# Patient Record
Sex: FEMALE | Race: OTHER | Employment: UNEMPLOYED | URBAN - METROPOLITAN AREA
[De-identification: names, ages, dates, MRNs, and addresses within clinical notes are randomized per-mention and may not be internally consistent; named-entity substitution may affect disease eponyms.]

---

## 2017-02-02 ENCOUNTER — OFFICE VISIT (OUTPATIENT)
Dept: INTERNAL MEDICINE CLINIC | Facility: CLINIC | Age: 52
End: 2017-02-02

## 2017-02-02 VITALS
HEART RATE: 87 BPM | SYSTOLIC BLOOD PRESSURE: 112 MMHG | BODY MASS INDEX: 42.03 KG/M2 | HEIGHT: 62 IN | TEMPERATURE: 97 F | DIASTOLIC BLOOD PRESSURE: 60 MMHG | WEIGHT: 228.38 LBS | RESPIRATION RATE: 16 BRPM | OXYGEN SATURATION: 98 %

## 2017-02-02 DIAGNOSIS — G44.229 CHRONIC TENSION-TYPE HEADACHE, NOT INTRACTABLE: ICD-10-CM

## 2017-02-02 DIAGNOSIS — M17.0 PRIMARY OSTEOARTHRITIS OF BOTH KNEES: ICD-10-CM

## 2017-02-02 DIAGNOSIS — E66.01 MORBID OBESITY WITH BMI OF 40.0-44.9, ADULT (HCC): ICD-10-CM

## 2017-02-02 DIAGNOSIS — E78.2 MIXED HYPERLIPIDEMIA: ICD-10-CM

## 2017-02-02 DIAGNOSIS — E11.9 TYPE 2 DIABETES MELLITUS WITHOUT COMPLICATION, WITHOUT LONG-TERM CURRENT USE OF INSULIN (HCC): ICD-10-CM

## 2017-02-02 DIAGNOSIS — M54.31 SCIATICA, RIGHT SIDE: Primary | ICD-10-CM

## 2017-02-02 DIAGNOSIS — M79.2 NEURITIS: ICD-10-CM

## 2017-02-02 PROCEDURE — 99214 OFFICE O/P EST MOD 30 MIN: CPT | Performed by: FAMILY MEDICINE

## 2017-02-02 PROCEDURE — 20610 DRAIN/INJ JOINT/BURSA W/O US: CPT | Performed by: FAMILY MEDICINE

## 2017-02-02 RX ORDER — ATORVASTATIN CALCIUM 40 MG/1
TABLET, FILM COATED ORAL
Refills: 0 | COMMUNITY
Start: 2017-01-28 | End: 2017-07-18

## 2017-02-02 RX ORDER — TRIAMCINOLONE ACETONIDE 40 MG/ML
40 INJECTION, SUSPENSION INTRA-ARTICULAR; INTRAMUSCULAR ONCE
Status: DISCONTINUED | OUTPATIENT
Start: 2017-02-02 | End: 2017-02-02

## 2017-02-02 RX ORDER — NAPROXEN 500 MG/1
500 TABLET ORAL 2 TIMES DAILY PRN
Qty: 60 TABLET | Refills: 1 | Status: ON HOLD | OUTPATIENT
Start: 2017-02-02 | End: 2017-07-10

## 2017-02-02 RX ORDER — GABAPENTIN 300 MG/1
300 CAPSULE ORAL 3 TIMES DAILY
Qty: 90 CAPSULE | Refills: 5 | Status: SHIPPED | OUTPATIENT
Start: 2017-02-02 | End: 2017-07-05

## 2017-02-02 RX ORDER — AMITRIPTYLINE HYDROCHLORIDE 25 MG/1
25 TABLET, FILM COATED ORAL NIGHTLY PRN
Qty: 30 TABLET | Refills: 5 | Status: SHIPPED | OUTPATIENT
Start: 2017-02-02 | End: 2017-03-09 | Stop reason: SINTOL

## 2017-02-02 RX ORDER — GLIPIZIDE 5 MG/1
TABLET ORAL
Refills: 4 | COMMUNITY
Start: 2016-12-27 | End: 2017-07-05 | Stop reason: DRUGHIGH

## 2017-02-03 NOTE — PROGRESS NOTES
CC:  Knee Pain and Leg Pain      Hx of CC:  FOLLOW UP ON CHRONIC KNEE PAINS R > L.  NOW ALSO WITH LOW BACK PAIN RADIATING TO RIGHT LEG X A COUPLE OF WEEKS. PATIENT DID NOT HAVE INSURANCE FOR A WHILE AND STOPPED ACTOS DUE TO COST.   ON METFORMIN BID AND G acetonide (KENALOG-40) 40 MG/ML injection 40 mg; Inject 1 mL (40 mg total) into the articular space once ON RIGHT SIDE WITH 1 CC LIDOCAINE (ANTEROMEDIAL APPROACH)    5.  Type 2 diabetes mellitus without complication, without long-term current use of insulin

## 2017-02-08 LAB
ALBUMIN SERPL BCP-MCNC: 4.1 G/DL (ref 3.5–4.8)
ALBUMIN/GLOB SERPL: 1.4 {RATIO} (ref 1–2)
ALP SERPL-CCNC: 70 U/L (ref 32–100)
ALT SERPL-CCNC: 18 U/L (ref 14–54)
ANION GAP SERPL CALC-SCNC: 9 MMOL/L (ref 0–18)
AST SERPL-CCNC: 19 U/L (ref 15–41)
BILIRUB SERPL-MCNC: 0.9 MG/DL (ref 0.3–1.2)
BUN SERPL-MCNC: 6 MG/DL (ref 8–20)
BUN/CREAT SERPL: 12 (ref 10–20)
CALCIUM SERPL-MCNC: 9.6 MG/DL (ref 8.5–10.5)
CHLORIDE SERPL-SCNC: 105 MMOL/L (ref 95–110)
CHOLEST SERPL-MCNC: 117 MG/DL (ref 110–200)
CO2 SERPL-SCNC: 29 MMOL/L (ref 22–32)
CREAT SERPL-MCNC: 0.5 MG/DL (ref 0.5–1.5)
GLOBULIN PLAS-MCNC: 2.9 G/DL (ref 2.5–3.7)
GLUCOSE SERPL-MCNC: 101 MG/DL (ref 70–99)
HBA1C MFR BLD: 6.5 % (ref 4–6)
HDLC SERPL-MCNC: 39 MG/DL
LDLC SERPL CALC-MCNC: 63 MG/DL (ref 0–99)
NONHDLC SERPL-MCNC: 78 MG/DL
OSMOLALITY UR CALC.SUM OF ELEC: 294 MOSM/KG (ref 275–295)
POTASSIUM SERPL-SCNC: 4.3 MMOL/L (ref 3.3–5.1)
PROT SERPL-MCNC: 7 G/DL (ref 5.9–8.4)
SODIUM SERPL-SCNC: 143 MMOL/L (ref 136–144)
TRIGL SERPL-MCNC: 75 MG/DL (ref 1–149)

## 2017-02-08 PROCEDURE — 83036 HEMOGLOBIN GLYCOSYLATED A1C: CPT | Performed by: FAMILY MEDICINE

## 2017-02-08 PROCEDURE — 80061 LIPID PANEL: CPT | Performed by: FAMILY MEDICINE

## 2017-02-08 PROCEDURE — 80053 COMPREHEN METABOLIC PANEL: CPT | Performed by: FAMILY MEDICINE

## 2017-02-17 RX ORDER — CYCLOBENZAPRINE HCL 10 MG
10 TABLET ORAL 3 TIMES DAILY PRN
Qty: 30 TABLET | Refills: 1 | Status: SHIPPED | OUTPATIENT
Start: 2017-02-17 | End: 2017-03-09

## 2017-03-04 ENCOUNTER — HOSPITAL ENCOUNTER (OUTPATIENT)
Dept: GENERAL RADIOLOGY | Age: 52
Discharge: HOME | End: 2017-03-04
Attending: FAMILY MEDICINE
Payer: MEDICAID

## 2017-03-04 DIAGNOSIS — M54.31 SCIATICA, RIGHT SIDE: ICD-10-CM

## 2017-03-04 PROCEDURE — 72120 X-RAY BEND ONLY L-S SPINE: CPT

## 2017-03-09 ENCOUNTER — OFFICE VISIT (OUTPATIENT)
Dept: INTERNAL MEDICINE CLINIC | Facility: CLINIC | Age: 52
End: 2017-03-09

## 2017-03-09 VITALS
HEIGHT: 62 IN | WEIGHT: 228 LBS | HEART RATE: 69 BPM | DIASTOLIC BLOOD PRESSURE: 72 MMHG | BODY MASS INDEX: 41.96 KG/M2 | SYSTOLIC BLOOD PRESSURE: 118 MMHG | OXYGEN SATURATION: 98 %

## 2017-03-09 DIAGNOSIS — E66.01 MORBID OBESITY WITH BMI OF 40.0-44.9, ADULT (HCC): ICD-10-CM

## 2017-03-09 DIAGNOSIS — M54.31 SCIATICA, RIGHT SIDE: Primary | ICD-10-CM

## 2017-03-09 PROCEDURE — 99213 OFFICE O/P EST LOW 20 MIN: CPT | Performed by: FAMILY MEDICINE

## 2017-03-09 RX ORDER — PREDNISONE 20 MG/1
TABLET ORAL
Qty: 9 TABLET | Refills: 0 | Status: SHIPPED | OUTPATIENT
Start: 2017-03-09 | End: 2017-03-27 | Stop reason: ALTCHOICE

## 2017-03-09 RX ORDER — HYDROCODONE BITARTRATE AND ACETAMINOPHEN 5; 325 MG/1; MG/1
1 TABLET ORAL EVERY 6 HOURS PRN
Qty: 50 TABLET | Refills: 0 | Status: SHIPPED | OUTPATIENT
Start: 2017-03-09 | End: 2017-06-19

## 2017-03-09 NOTE — PROGRESS NOTES
CC:  Pain      Hx of CC:  PERSISTENT SEVERE RIGHT LOW BACK PAIN RADIATING TO POSTERIOR RIGHT LEG.  WORSE WITH WEIGHT BEARING OR MOVEMENT. NO ACUTE INJURY.     Vitals:    03/09/17  0940   BP: 118/72   Pulse: 69   Height: 62\"   Weight: 228 lb   SpO2: 98%

## 2017-03-27 ENCOUNTER — OFFICE VISIT (OUTPATIENT)
Dept: INTERNAL MEDICINE CLINIC | Facility: CLINIC | Age: 52
End: 2017-03-27

## 2017-03-27 VITALS
HEIGHT: 62 IN | DIASTOLIC BLOOD PRESSURE: 72 MMHG | SYSTOLIC BLOOD PRESSURE: 128 MMHG | WEIGHT: 227 LBS | OXYGEN SATURATION: 98 % | HEART RATE: 83 BPM | BODY MASS INDEX: 41.77 KG/M2

## 2017-03-27 DIAGNOSIS — J01.10 ACUTE FRONTAL SINUSITIS, RECURRENCE NOT SPECIFIED: ICD-10-CM

## 2017-03-27 DIAGNOSIS — H10.13 ACUTE ATOPIC CONJUNCTIVITIS OF BOTH EYES: ICD-10-CM

## 2017-03-27 DIAGNOSIS — J30.2 SEASONAL ALLERGIC RHINITIS, UNSPECIFIED ALLERGIC RHINITIS TRIGGER: Primary | ICD-10-CM

## 2017-03-27 PROCEDURE — 99213 OFFICE O/P EST LOW 20 MIN: CPT | Performed by: FAMILY MEDICINE

## 2017-03-27 RX ORDER — KETOTIFEN FUMARATE 0.35 MG/ML
1 SOLUTION/ DROPS OPHTHALMIC 2 TIMES DAILY
Qty: 5 ML | Refills: 2 | Status: SHIPPED | OUTPATIENT
Start: 2017-03-27 | End: 2018-02-06 | Stop reason: ALTCHOICE

## 2017-03-27 RX ORDER — FLUTICASONE PROPIONATE 50 MCG
1 SPRAY, SUSPENSION (ML) NASAL 2 TIMES DAILY PRN
Qty: 1 BOTTLE | Refills: 2 | Status: SHIPPED | OUTPATIENT
Start: 2017-03-27 | End: 2018-02-06 | Stop reason: ALTCHOICE

## 2017-03-27 RX ORDER — AMOXICILLIN 875 MG/1
875 TABLET, COATED ORAL 2 TIMES DAILY
Qty: 28 TABLET | Refills: 0 | Status: SHIPPED | OUTPATIENT
Start: 2017-03-27 | End: 2017-04-10

## 2017-03-29 NOTE — PROGRESS NOTES
CC:  Sore Throat      Hx of CC:  ITCHY EYES, NOSE CONGESTION AND FRONTAL PRESSURE X 10 DAYS OR SO.    Vitals:    03/27/17  1710   BP: 128/72   Pulse: 83   Height: 62\"   Weight: 227 lb   SpO2: 98%         Body mass index is 41.51 kg/(m^2).     ROS:  General

## 2017-04-25 ENCOUNTER — OFFICE VISIT (OUTPATIENT)
Dept: FAMILY MEDICINE CLINIC | Facility: CLINIC | Age: 52
End: 2017-04-25

## 2017-04-25 VITALS
OXYGEN SATURATION: 97 % | WEIGHT: 220 LBS | RESPIRATION RATE: 14 BRPM | DIASTOLIC BLOOD PRESSURE: 70 MMHG | SYSTOLIC BLOOD PRESSURE: 104 MMHG | BODY MASS INDEX: 40 KG/M2 | TEMPERATURE: 100 F | HEART RATE: 110 BPM

## 2017-04-25 DIAGNOSIS — J02.0 STREP THROAT: Primary | ICD-10-CM

## 2017-04-25 PROCEDURE — 87880 STREP A ASSAY W/OPTIC: CPT | Performed by: NURSE PRACTITIONER

## 2017-04-25 PROCEDURE — 99213 OFFICE O/P EST LOW 20 MIN: CPT | Performed by: NURSE PRACTITIONER

## 2017-04-25 RX ORDER — CYCLOBENZAPRINE HCL 10 MG
TABLET ORAL
COMMUNITY
Start: 2017-03-27 | End: 2017-05-11

## 2017-04-25 RX ORDER — AMOXICILLIN 875 MG/1
875 TABLET, COATED ORAL 2 TIMES DAILY
Qty: 20 TABLET | Refills: 0 | Status: SHIPPED | OUTPATIENT
Start: 2017-04-25 | End: 2017-05-05

## 2017-04-25 RX ORDER — ASPIRIN 81 MG
TABLET, DELAYED RELEASE (ENTERIC COATED) ORAL
COMMUNITY
Start: 2017-03-14 | End: 2018-02-06

## 2017-04-25 RX ORDER — GABAPENTIN 600 MG/1
600 TABLET ORAL 3 TIMES DAILY PRN
COMMUNITY
Start: 2017-04-25 | End: 2017-07-18

## 2017-04-25 NOTE — PROGRESS NOTES
CHIEF COMPLAINT:   Patient presents with:  URI    Translation provided by  and partially by  Lance Shah ID# [id number]     HPI:   Rachid Baron is a 45year old female who presents with URI for 0.5 days.    Onset was quick ,   Symptoms are progre Pioglitazone HCl 45 MG Oral Tab Take 1 tablet (45 mg total) by mouth daily.  Increasing dosage from 30 --> 45 mg Disp: 30 tablet Rfl: 5   Vitamin D, Ergocalciferol, (DRISDOL) 37943 UNITS Oral Cap  Disp:  Rfl: 0   Omeprazole 20 MG Oral Tab EC  Disp:  Rfl: 3 HEAD: atraumatic, normocephalic, no tenderness on palpation of sinuses.   EYES: conjunctiva clear, EOM intact, normal pupils, PERRLA  EARS: Canals are clear with no discharge or inflammation, TM's are pearly white and intact, some fluid present behind TM's, Sig: Take 1 tablet (875 mg total) by mouth 2 (two) times daily. Risks, benefits, and side effects of medication explained and discussed.     Patient Instructions     Faringitis Por Estreptococos [Pharyngitis, Strep - Confirmed]    Ara roman anabell · Niños: Use acetaminofén (Tylenol) para [employer]KE fiebre, el nerviosismo y el malestar.  En niños mayores de seis meses puede usar ibuprofeno (emerson Motrin infantil) en vez de Tylenol.  [NOTA: Si el andree tiene jessee enfermedad hepática o renal crónica, o ha © 1277-5215 The [facility], [facility] [place]. Todos los derechos reservados. Esta información no pretende sustituir la atención médica profesional. Sólo plummer médico puede diagnosticar y tratar un problema de oliverio. Si olvida jessee dosis, tómela lo antes posible. Si es diomedes la hora de la próxima dosis, tome sólo he dosis. No tome dosis adicionales o dobles. ¿Dónde kalin guardar mi medicina? Mant[place]ala fuera del alcance de los niños.   Donnal Arrant a Fei Kwok, e La fiebre [fever] es jessee reacción natural que el cuerpo tiene ante jessee enfermedad. En la [employer], tener temperatura lulu no es algo santino en sí mismo.  En realidad, [place] a que el cuerpo pueda luchar contra las infecciones [infections].  No nece · No use aspirina [aspirin] en un andree jorge de 18 años que tenga Reginaldo, porque puede provocarle graves daños en el hígado. Trino jessee VISITA DE CONTROL a plummer médico, o según le indique nuestro personal médico, si no mejora después de 48 horas.   Terra Abed

## 2017-04-25 NOTE — PATIENT INSTRUCTIONS
Faringitis Por Estreptococos [Pharyngitis, Strep - Confirmed]    [place] prueba anabell positiva a la infección por estreptococos. Se trata de jessee enfermedad contagiosa.  La puede contagiar al toser, al besar o al tocar a otras personas después de haberse tocado l · Niños: Use acetaminofén (Tylenol) para [employer]KE fiebre, el nerviosismo y el malestar.  En niños mayores de seis meses puede usar ibuprofeno (emerson Motrin infantil) en vez de Tylenol.  [NOTA: Si el andree tiene jessee enfermedad hepática o renal crónica, o ha © 9500-2394 The [facility], [facility] [place]. Todos los derechos reservados. Esta información no pretende sustituir la atención médica profesional. Sólo plummer médico puede diagnosticar y tratar un problema de oliverio. Si olvida jessee dosis, tómela lo antes posible. Si es diomedes la hora de la próxima dosis, tome sólo he dosis. No tome dosis adicionales o dobles. ¿Dónde kalin guardar mi medicina? [place] fuera del alcance de los niños.   Cressey Heater a [facility], e La fiebre [fever] es jessee reacción natural que el cuerpo tiene ante jessee enfermedad. En la [employer], tener temperatura lulu no es algo santino en sí mismo.  En realidad, [place] a que el cuerpo pueda luchar contra las infecciones [infections].  No nece · No use aspirina [aspirin] en un andree jorge de 18 años que tenga Reginaldo, porque puede provocarle graves daños en el hígado. Trino jessee VISITA DE CONTROL a plummer médico, o según le indique nuestro personal médico, si no mejora después de 48 horas.   Griffin Bocanegra

## 2017-05-11 ENCOUNTER — OFFICE VISIT (OUTPATIENT)
Dept: INTERNAL MEDICINE CLINIC | Facility: CLINIC | Age: 52
End: 2017-05-11

## 2017-05-11 VITALS
HEART RATE: 77 BPM | SYSTOLIC BLOOD PRESSURE: 128 MMHG | BODY MASS INDEX: 41.96 KG/M2 | WEIGHT: 228 LBS | DIASTOLIC BLOOD PRESSURE: 70 MMHG | HEIGHT: 62 IN | OXYGEN SATURATION: 98 %

## 2017-05-11 DIAGNOSIS — Z87.81 HISTORY OF FRACTURE OF RIGHT ANKLE: ICD-10-CM

## 2017-05-11 DIAGNOSIS — Z98.890 STATUS POST ORIF OF FRACTURE OF ANKLE: ICD-10-CM

## 2017-05-11 DIAGNOSIS — Z87.81 STATUS POST ORIF OF FRACTURE OF ANKLE: ICD-10-CM

## 2017-05-11 DIAGNOSIS — M54.31 SCIATICA, RIGHT SIDE: Primary | ICD-10-CM

## 2017-05-11 DIAGNOSIS — B35.6 TINEA CRURIS: ICD-10-CM

## 2017-05-11 DIAGNOSIS — M62.838 MUSCLE SPASM OF RIGHT LOWER EXTREMITY: ICD-10-CM

## 2017-05-11 DIAGNOSIS — Z12.39 BREAST CANCER SCREENING: ICD-10-CM

## 2017-05-11 PROCEDURE — 99214 OFFICE O/P EST MOD 30 MIN: CPT | Performed by: FAMILY MEDICINE

## 2017-05-11 RX ORDER — KETOCONAZOLE 20 MG/G
1 CREAM TOPICAL DAILY
Qty: 60 G | Refills: 1 | Status: SHIPPED | OUTPATIENT
Start: 2017-05-11 | End: 2017-07-05

## 2017-05-11 RX ORDER — CYCLOBENZAPRINE HCL 10 MG
10 TABLET ORAL 3 TIMES DAILY PRN
Qty: 90 TABLET | Refills: 0 | Status: SHIPPED | OUTPATIENT
Start: 2017-05-11 | End: 2017-07-18

## 2017-05-12 NOTE — PROGRESS NOTES
CC:  UTI; Referral; and Orders Call      Hx of CC:  VULVAR RASH WITH BURNS A LITTLE ON URINATING. NO URINE PAIN PER SE.  PATIENT S/P RIGHT ANKLE ORIF 10 YEARS AGO, C/O RESIDUAL LATERAL ANKLE PAIN AT SITE OF PLATE-->WISHING TO HAVE METAL HARDWARE REMOVED.

## 2017-05-17 DIAGNOSIS — J20.9 BRONCHITIS, ACUTE, WITH BRONCHOSPASM: Primary | ICD-10-CM

## 2017-05-18 RX ORDER — ALBUTEROL SULFATE 90 UG/1
2 AEROSOL, METERED RESPIRATORY (INHALATION) EVERY 4 HOURS PRN
Qty: 1 INHALER | Refills: 0 | Status: SHIPPED | OUTPATIENT
Start: 2017-05-18 | End: 2017-09-02

## 2017-05-19 ENCOUNTER — OFFICE VISIT (OUTPATIENT)
Dept: FAMILY MEDICINE CLINIC | Facility: CLINIC | Age: 52
End: 2017-05-19

## 2017-05-19 VITALS
DIASTOLIC BLOOD PRESSURE: 64 MMHG | SYSTOLIC BLOOD PRESSURE: 116 MMHG | TEMPERATURE: 98 F | BODY MASS INDEX: 39.87 KG/M2 | HEART RATE: 70 BPM | WEIGHT: 225 LBS | HEIGHT: 63 IN | RESPIRATION RATE: 16 BRPM | OXYGEN SATURATION: 97 %

## 2017-05-19 DIAGNOSIS — J01.10 ACUTE FRONTAL SINUSITIS, RECURRENCE NOT SPECIFIED: Primary | ICD-10-CM

## 2017-05-19 PROCEDURE — 99213 OFFICE O/P EST LOW 20 MIN: CPT | Performed by: NURSE PRACTITIONER

## 2017-05-19 RX ORDER — AMOXICILLIN AND CLAVULANATE POTASSIUM 875; 125 MG/1; MG/1
1 TABLET, FILM COATED ORAL 2 TIMES DAILY
Qty: 14 TABLET | Refills: 0 | Status: SHIPPED | OUTPATIENT
Start: 2017-05-19 | End: 2017-05-26

## 2017-05-19 NOTE — PROGRESS NOTES
Enmanuel Galdamez CHIEF COMPLAINT:   Patient presents with:  Cough/URI: no fever, X 2 weeks,       HPI:   Anne Marie Mason is a 45year old female who presents for cold symptoms for  2  weeks.  Symptoms have progressed into sinus congestion and been worsening since onset gabapentin 300 MG Oral Cap Take 1 capsule (300 mg total) by mouth 3 (three) times daily. Disp: 90 capsule Rfl: 5   aspirin 81 MG Oral Tab Take 1 tablet (81 mg total) by mouth daily.  Disp: 30 tablet Rfl: 11   lisinopril 2.5 MG Oral Tab Take 1 tablet (2.5 mg HEAD: atraumatic, normocephalic, +  tenderness on palpation of frontal sinuses  EYES: conjunctiva clear, EOM intact  EARS: TM's gray, non bulging, no retraction, serous fluid, bony landmarks visible  NOSE: nostrils patent, clear nasal mucous, nasal mucosa The sinuses are air-filled spaces within the bones of the face. They connect to the inside of the nose. Sinusitis is an inflammation of the tissue lining the sinus cavity. Sinus inflammation can occur during a cold.  It can also be due to allergies to polle · Do not use nasal rinses or irrigation during an acute sinus infection, unless told to by your health care provider. Rinsing may spread the infection to other sinuses.   · Use acetaminophen or ibuprofen to control pain, unless another pain medicine was pre Los senos paranasales son cavidades llenas de aire dentro de los huesos de la saranya. Se conectan con la parte interna de la Carter.  La sinusitis es jessee inflamación del tejido que recubre la cavidad del seno paranasal. Rey inflamación puede presentarse belem · Puede jason algún descongestionante sin receta a menos que le hayan recetado algún medicamento similar. Los sprays nasales son los que tienen [address] rápido.  Use alguno que contenga fenilefrina (Chapito-Synephrine, Sinex, entre otros) o oximetazolina (Afr · Termine de jason todos los medicamentos que le hayan recetado aunque ya se sienta mejor a los pocos días. Programe jessee VISITA DE CONTROL con plummer médico o a milagros centro, o según le indique nuestro personal médico, si no se siente mejor.   Busque Prontament Hable con plummer pediatra para informarse acerca del uso de milagros medicamento en niños. Puede requerir atención especial.  ¿Qué efectos secundarios puedo tener al [employer] milagros medicamento?   Efectos secundarios que debe informar a plummer médico o a plummer profesional d · mononucleosis  · jessee reacción alérgica o inusual a la amoxicilina, penicilinas, cefalosporínicos, a otros antibióticos, al ácido clavulánico, a otros medicamentos, alimentos, colorantes o conservantes  · si está embarazada o buscando quedar embarazada  · Take this medicine by mouth with a full glass of water. Follow the directions on the prescription label. Take at the start of a meal. Do not crush or chew. If the tablet has a score line, you may cut it in half at the score line for easier swallowing.  Take What should I tell my health care provider before I take this medicine?   They need to know if you have any of these conditions:  · bowel disease, like colitis  · kidney disease  · liver disease  · mononucleosis  · an unusual or allergic reaction to amoxici What side effects may I notice from receiving this medicine?   Side effects that you should report to your doctor or health care professional as soon as possible:  · allergic reactions like skin rash, itching or hives, swelling of the face, lips, or tongue Do not treat yourself for a cough for more than 1 week without consulting your doctor or health care professional. If you have a high fever, skin rash, lasting headache, or sore throat, see your doctor.   Drink 6 to 8 glasses of water daily while you are ta · problemas respiratorios  · confusión  · agitación, nerviosismo, inquietud o irritabilidad  Efectos secundarios que, por lo general, no requieren [employer] (debe informarlos a plummer médico o a plummer profesional de la oliverio si persisten o si son molestos): En denise de tos, no se trate usted mismo rajinder más de 1 semana sin consultar a plummer médico o a plummer profesional de [employer].  Si tiene fiebre lulu, erupción cutánea, dolor de hazel persistente o dolor de garganta, consulte a plummer médico.  Janell 6 a 8 vasos de ag

## 2017-05-19 NOTE — PATIENT INSTRUCTIONS
Sinusitis (Antibiotic Treatment)    The sinuses are air-filled spaces within the bones of the face. They connect to the inside of the nose. Sinusitis is an inflammation of the tissue lining the sinus cavity. Sinus inflammation can occur during a cold.  It · Do not use nasal rinses or irrigation during an acute sinus infection, unless told to by your health care provider. Rinsing may spread the infection to other sinuses.   · Use acetaminophen or ibuprofen to control pain, unless another pain medicine was pre The sinuses are air-filled spaces within the bones of the face. They connect to the inside of the nose. Sinusitis is an inflammation of the tissue lining the sinus cavity. Sinus inflammation can occur during a cold.  It can also be due to allergies to polle Los senos paranasales son cavidades llenas de aire dentro de los huesos de la saranya. Se conectan con la parte interna de la Carter.  La sinusitis es jessee inflamación del tejido que recubre la cavidad del seno paranasal. Rey inflamación puede presentarse belem · Puede jason algún descongestionante sin receta a menos que le hayan recetado algún medicamento similar. Los sprays nasales son los que tienen [address] rápido.  Use alguno que contenga fenilefrina (Chapito-Synephrine, Sinex, entre otros) o oximetazolina (Afr · Termine de jason todos los medicamentos que le hayan recetado aunque ya se sienta mejor a los pocos días. Programe jessee VISITA DE CONTROL con plummer médico o a milagros centro, o según le indique nuestro personal médico, si no se siente mejor.   Busque Prontament Hable con plummer pediatra para informarse acerca del uso de milagros medicamento en niños. Puede requerir atención especial.  ¿Qué efectos secundarios puedo tener al [employer] milagros medicamento?   Efectos secundarios que debe informar a plummer médico o a plummer profesional d · mononucleosis  · jessee reacción alérgica o inusual a la amoxicilina, penicilinas, cefalosporínicos, a otros antibióticos, al ácido clavulánico, a otros medicamentos, alimentos, colorantes o conservantes  · si está embarazada o buscando quedar embarazada  · Take this medicine by mouth with a full glass of water. Follow the directions on the prescription label. Take at the start of a meal. Do not crush or chew. If the tablet has a score line, you may cut it in half at the score line for easier swallowing.  Take What should I tell my health care provider before I take this medicine?   They need to know if you have any of these conditions:  · bowel disease, like colitis  · kidney disease  · liver disease  · mononucleosis  · an unusual or allergic reaction to amoxici What side effects may I notice from receiving this medicine?   Side effects that you should report to your doctor or health care professional as soon as possible:  · allergic reactions like skin rash, itching or hives, swelling of the face, lips, or tongue Do not treat yourself for a cough for more than 1 week without consulting your doctor or health care professional. If you have a high fever, skin rash, lasting headache, or sore throat, see your doctor.   Drink 6 to 8 glasses of water daily while you are ta · problemas respiratorios  · confusión  · agitación, nerviosismo, inquietud o irritabilidad  Efectos secundarios que, por lo general, no requieren [employer] (debe informarlos a plummer médico o a plummer profesional de la oliverio si persisten o si son molestos): En denise de tos, no se trate usted mismo rajinder más de 1 semana sin consultar a plummer médico o a plummer profesional de [employer].  Si tiene fiebre lulu, erupción cutánea, dolor de hazel persistente o dolor de garganta, consulte a plummer médico.  Janell 6 a 8 vasos de ag

## 2017-05-23 ENCOUNTER — TELEPHONE (OUTPATIENT)
Dept: INTERNAL MEDICINE CLINIC | Facility: CLINIC | Age: 52
End: 2017-05-23

## 2017-06-19 DIAGNOSIS — M54.31 SCIATICA, RIGHT SIDE: Primary | ICD-10-CM

## 2017-06-20 ENCOUNTER — OFFICE VISIT (OUTPATIENT)
Dept: ORTHOPEDICS CLINIC | Facility: CLINIC | Age: 52
End: 2017-06-20

## 2017-06-20 ENCOUNTER — HOSPITAL ENCOUNTER (OUTPATIENT)
Dept: GENERAL RADIOLOGY | Facility: HOSPITAL | Age: 52
Discharge: HOME | End: 2017-06-20
Attending: ORTHOPAEDIC SURGERY
Payer: MEDICAID

## 2017-06-20 DIAGNOSIS — M25.571 RIGHT ANKLE PAIN, UNSPECIFIED CHRONICITY: ICD-10-CM

## 2017-06-20 DIAGNOSIS — T85.848A PAIN FROM IMPLANTED HARDWARE, INITIAL ENCOUNTER: ICD-10-CM

## 2017-06-20 DIAGNOSIS — M25.571 ACUTE RIGHT ANKLE PAIN: Primary | ICD-10-CM

## 2017-06-20 DIAGNOSIS — M51.36 DDD (DEGENERATIVE DISC DISEASE), LUMBAR: ICD-10-CM

## 2017-06-20 PROCEDURE — 73610 X-RAY EXAM OF ANKLE: CPT | Performed by: ORTHOPAEDIC SURGERY

## 2017-06-20 PROCEDURE — 99212 OFFICE O/P EST SF 10 MIN: CPT | Performed by: ORTHOPAEDIC SURGERY

## 2017-06-20 PROCEDURE — 99243 OFF/OP CNSLTJ NEW/EST LOW 30: CPT | Performed by: ORTHOPAEDIC SURGERY

## 2017-06-20 RX ORDER — HYDROCODONE BITARTRATE AND ACETAMINOPHEN 5; 325 MG/1; MG/1
1 TABLET ORAL EVERY 6 HOURS PRN
Qty: 50 TABLET | Refills: 0 | Status: ON HOLD | OUTPATIENT
Start: 2017-06-20 | End: 2017-07-10

## 2017-06-21 NOTE — H&P
6/20/2017  Hong Jimenez  4/18/1965  45year old   female  Niki Haskins MD    HPI:   Patient presents with: Ankle Pain: Right- pt here w/ daughter, Nora Sanchez, for Swedish translation.  She states pt had ORIF 10 yrs ago at [facility] by Dr. Kamla Yeung. She start Ketotifen Fumarate 0.025 % Ophthalmic Solution Place 1 drop into both eyes 2 (two) times daily.  Disp: 5 mL Rfl: 2   Atorvastatin Calcium 40 MG Oral Tab TK 1 T PO D Disp:  Rfl: 0   glipiZIDE 5 MG Oral Tab TK 1 T PO QAM BEFORE BREAKFAST Disp:  Rfl: 4   nap non-tender and atraumatic with the exception of their lumbar spine and right ankle. The patient's skin is intact and compartments are soft. The patient's lower extremities are warm and pink with brisk capillary refill and 2+ distal pulses.   Sensation i weightbearing as tolerated on the right lower extremity after surgery. The patient will benefit from being evaluated by Dr. Akil Reddy for degenerative disc disease of the lumbar spine at this time.     The risks, benefits, and alternatives of surgical dominic

## 2017-06-29 ENCOUNTER — TELEPHONE (OUTPATIENT)
Dept: ORTHOPEDICS CLINIC | Facility: CLINIC | Age: 52
End: 2017-06-29

## 2017-07-05 RX ORDER — GLIPIZIDE 5 MG/1
5 TABLET ORAL
COMMUNITY
End: 2017-07-18

## 2017-07-06 ENCOUNTER — HOSPITAL ENCOUNTER (OUTPATIENT)
Dept: MAMMOGRAPHY | Age: 52
Discharge: HOME | End: 2017-07-06
Attending: FAMILY MEDICINE
Payer: COMMERCIAL

## 2017-07-06 DIAGNOSIS — Z12.39 BREAST CANCER SCREENING: ICD-10-CM

## 2017-07-06 PROCEDURE — 77067 SCR MAMMO BI INCL CAD: CPT | Performed by: FAMILY MEDICINE

## 2017-07-10 ENCOUNTER — SURGERY (OUTPATIENT)
Age: 52
End: 2017-07-10

## 2017-07-10 ENCOUNTER — ANESTHESIA (OUTPATIENT)
Dept: SURGERY | Facility: HOSPITAL | Age: 52
End: 2017-07-10
Payer: COMMERCIAL

## 2017-07-10 ENCOUNTER — APPOINTMENT (OUTPATIENT)
Dept: GENERAL RADIOLOGY | Facility: HOSPITAL | Age: 52
End: 2017-07-10
Attending: ORTHOPAEDIC SURGERY
Payer: COMMERCIAL

## 2017-07-10 ENCOUNTER — ANESTHESIA EVENT (OUTPATIENT)
Dept: SURGERY | Facility: HOSPITAL | Age: 52
End: 2017-07-10
Payer: COMMERCIAL

## 2017-07-10 ENCOUNTER — HOSPITAL ENCOUNTER (OUTPATIENT)
Facility: HOSPITAL | Age: 52
Setting detail: HOSPITAL OUTPATIENT SURGERY
Discharge: HOME | End: 2017-07-10
Attending: ORTHOPAEDIC SURGERY | Admitting: ORTHOPAEDIC SURGERY
Payer: COMMERCIAL

## 2017-07-10 VITALS
TEMPERATURE: 98 F | BODY MASS INDEX: 40.01 KG/M2 | SYSTOLIC BLOOD PRESSURE: 125 MMHG | WEIGHT: 225.81 LBS | DIASTOLIC BLOOD PRESSURE: 77 MMHG | HEIGHT: 63 IN | HEART RATE: 66 BPM | RESPIRATION RATE: 18 BRPM | OXYGEN SATURATION: 100 %

## 2017-07-10 DIAGNOSIS — Z96.9 RETAINED ORTHOPEDIC HARDWARE: Primary | ICD-10-CM

## 2017-07-10 LAB — GLUCOSE BLDC GLUCOMTR-MCNC: 127 MG/DL (ref 70–99)

## 2017-07-10 PROCEDURE — 82962 GLUCOSE BLOOD TEST: CPT

## 2017-07-10 PROCEDURE — 73600 X-RAY EXAM OF ANKLE: CPT | Performed by: ORTHOPAEDIC SURGERY

## 2017-07-10 PROCEDURE — 0SPF04Z REMOVAL OF INTERNAL FIXATION DEVICE FROM RIGHT ANKLE JOINT, OPEN APPROACH: ICD-10-PCS | Performed by: ORTHOPAEDIC SURGERY

## 2017-07-10 PROCEDURE — 88300 SURGICAL PATH GROSS: CPT | Performed by: ORTHOPAEDIC SURGERY

## 2017-07-10 PROCEDURE — 76001 XR C-ARM FLUORO >1 HOUR  (CPT=76001): CPT | Performed by: ORTHOPAEDIC SURGERY

## 2017-07-10 RX ORDER — SODIUM CHLORIDE, SODIUM LACTATE, POTASSIUM CHLORIDE, CALCIUM CHLORIDE 600; 310; 30; 20 MG/100ML; MG/100ML; MG/100ML; MG/100ML
INJECTION, SOLUTION INTRAVENOUS CONTINUOUS
Status: DISCONTINUED | OUTPATIENT
Start: 2017-07-10 | End: 2017-07-10

## 2017-07-10 RX ORDER — MORPHINE SULFATE 4 MG/ML
6 INJECTION, SOLUTION INTRAMUSCULAR; INTRAVENOUS EVERY 10 MIN PRN
Status: DISCONTINUED | OUTPATIENT
Start: 2017-07-10 | End: 2017-07-10

## 2017-07-10 RX ORDER — METOCLOPRAMIDE 10 MG/1
10 TABLET ORAL ONCE
Status: COMPLETED | OUTPATIENT
Start: 2017-07-10 | End: 2017-07-10

## 2017-07-10 RX ORDER — ONDANSETRON 2 MG/ML
4 INJECTION INTRAMUSCULAR; INTRAVENOUS ONCE AS NEEDED
Status: DISCONTINUED | OUTPATIENT
Start: 2017-07-10 | End: 2017-07-10

## 2017-07-10 RX ORDER — HYDROCODONE BITARTRATE AND ACETAMINOPHEN 5; 325 MG/1; MG/1
2 TABLET ORAL AS NEEDED
Status: COMPLETED | OUTPATIENT
Start: 2017-07-10 | End: 2017-07-10

## 2017-07-10 RX ORDER — DEXAMETHASONE SODIUM PHOSPHATE 4 MG/ML
VIAL (ML) INJECTION AS NEEDED
Status: DISCONTINUED | OUTPATIENT
Start: 2017-07-10 | End: 2017-07-10 | Stop reason: SURG

## 2017-07-10 RX ORDER — SODIUM CHLORIDE, SODIUM LACTATE, POTASSIUM CHLORIDE, CALCIUM CHLORIDE 600; 310; 30; 20 MG/100ML; MG/100ML; MG/100ML; MG/100ML
INJECTION, SOLUTION INTRAVENOUS CONTINUOUS PRN
Status: DISCONTINUED | OUTPATIENT
Start: 2017-07-10 | End: 2017-07-10 | Stop reason: SURG

## 2017-07-10 RX ORDER — HYDROMORPHONE HYDROCHLORIDE 1 MG/ML
0.2 INJECTION, SOLUTION INTRAMUSCULAR; INTRAVENOUS; SUBCUTANEOUS EVERY 5 MIN PRN
Status: DISCONTINUED | OUTPATIENT
Start: 2017-07-10 | End: 2017-07-10

## 2017-07-10 RX ORDER — HYDROCODONE BITARTRATE AND ACETAMINOPHEN 5; 325 MG/1; MG/1
1 TABLET ORAL AS NEEDED
Status: COMPLETED | OUTPATIENT
Start: 2017-07-10 | End: 2017-07-10

## 2017-07-10 RX ORDER — NALOXONE HYDROCHLORIDE 0.4 MG/ML
80 INJECTION, SOLUTION INTRAMUSCULAR; INTRAVENOUS; SUBCUTANEOUS AS NEEDED
Status: DISCONTINUED | OUTPATIENT
Start: 2017-07-10 | End: 2017-07-10

## 2017-07-10 RX ORDER — ACETAMINOPHEN 325 MG/1
650 TABLET ORAL ONCE
Status: COMPLETED | OUTPATIENT
Start: 2017-07-10 | End: 2017-07-10

## 2017-07-10 RX ORDER — HALOPERIDOL 5 MG/ML
0.25 INJECTION INTRAMUSCULAR ONCE AS NEEDED
Status: DISCONTINUED | OUTPATIENT
Start: 2017-07-10 | End: 2017-07-10

## 2017-07-10 RX ORDER — ONDANSETRON 2 MG/ML
INJECTION INTRAMUSCULAR; INTRAVENOUS AS NEEDED
Status: DISCONTINUED | OUTPATIENT
Start: 2017-07-10 | End: 2017-07-10 | Stop reason: SURG

## 2017-07-10 RX ORDER — MORPHINE SULFATE 2 MG/ML
2 INJECTION, SOLUTION INTRAMUSCULAR; INTRAVENOUS EVERY 10 MIN PRN
Status: DISCONTINUED | OUTPATIENT
Start: 2017-07-10 | End: 2017-07-10

## 2017-07-10 RX ORDER — MORPHINE SULFATE 4 MG/ML
4 INJECTION, SOLUTION INTRAMUSCULAR; INTRAVENOUS EVERY 10 MIN PRN
Status: DISCONTINUED | OUTPATIENT
Start: 2017-07-10 | End: 2017-07-10

## 2017-07-10 RX ORDER — LIDOCAINE HYDROCHLORIDE 10 MG/ML
INJECTION, SOLUTION EPIDURAL; INFILTRATION; INTRACAUDAL; PERINEURAL AS NEEDED
Status: DISCONTINUED | OUTPATIENT
Start: 2017-07-10 | End: 2017-07-10 | Stop reason: SURG

## 2017-07-10 RX ORDER — BUPIVACAINE HYDROCHLORIDE 5 MG/ML
INJECTION, SOLUTION EPIDURAL; INTRACAUDAL AS NEEDED
Status: DISCONTINUED | OUTPATIENT
Start: 2017-07-10 | End: 2017-07-10 | Stop reason: HOSPADM

## 2017-07-10 RX ORDER — HYDROCODONE BITARTRATE AND ACETAMINOPHEN 5; 325 MG/1; MG/1
1-2 TABLET ORAL EVERY 6 HOURS PRN
Qty: 40 TABLET | Refills: 0 | Status: SHIPPED | OUTPATIENT
Start: 2017-07-10 | End: 2017-07-18

## 2017-07-10 RX ORDER — FAMOTIDINE 20 MG/1
20 TABLET ORAL ONCE
Status: COMPLETED | OUTPATIENT
Start: 2017-07-10 | End: 2017-07-10

## 2017-07-10 RX ORDER — HYDROMORPHONE HYDROCHLORIDE 1 MG/ML
0.4 INJECTION, SOLUTION INTRAMUSCULAR; INTRAVENOUS; SUBCUTANEOUS EVERY 5 MIN PRN
Status: DISCONTINUED | OUTPATIENT
Start: 2017-07-10 | End: 2017-07-10

## 2017-07-10 RX ORDER — MIDAZOLAM HYDROCHLORIDE 1 MG/ML
INJECTION INTRAMUSCULAR; INTRAVENOUS AS NEEDED
Status: DISCONTINUED | OUTPATIENT
Start: 2017-07-10 | End: 2017-07-10 | Stop reason: SURG

## 2017-07-10 RX ORDER — HYDROMORPHONE HYDROCHLORIDE 1 MG/ML
0.6 INJECTION, SOLUTION INTRAMUSCULAR; INTRAVENOUS; SUBCUTANEOUS EVERY 5 MIN PRN
Status: DISCONTINUED | OUTPATIENT
Start: 2017-07-10 | End: 2017-07-10

## 2017-07-10 RX ADMIN — LIDOCAINE HYDROCHLORIDE 50 MG: 10 INJECTION, SOLUTION EPIDURAL; INFILTRATION; INTRACAUDAL; PERINEURAL at 15:23:00

## 2017-07-10 RX ADMIN — DEXAMETHASONE SODIUM PHOSPHATE 4 MG: 4 MG/ML VIAL (ML) INJECTION at 15:26:00

## 2017-07-10 RX ADMIN — SODIUM CHLORIDE, SODIUM LACTATE, POTASSIUM CHLORIDE, CALCIUM CHLORIDE: 600; 310; 30; 20 INJECTION, SOLUTION INTRAVENOUS at 16:19:00

## 2017-07-10 RX ADMIN — ONDANSETRON 4 MG: 2 INJECTION INTRAMUSCULAR; INTRAVENOUS at 15:26:00

## 2017-07-10 RX ADMIN — MIDAZOLAM HYDROCHLORIDE 2 MG: 1 INJECTION INTRAMUSCULAR; INTRAVENOUS at 15:23:00

## 2017-07-10 RX ADMIN — SODIUM CHLORIDE, SODIUM LACTATE, POTASSIUM CHLORIDE, CALCIUM CHLORIDE: 600; 310; 30; 20 INJECTION, SOLUTION INTRAVENOUS at 15:20:00

## 2017-07-10 NOTE — H&P (VIEW-ONLY)
6/20/2017  Gina Hassan  4/18/1965  45year old   female  Lita Aguilar MD    HPI:   Patient presents with: Ankle Pain: Right- pt here w/ daughter, Fareed Gruber, for Taiwanese translation.  She states pt had ORIF 10 yrs ago at [facility] by Dr. Lisa Ayala. She start Ketotifen Fumarate 0.025 % Ophthalmic Solution Place 1 drop into both eyes 2 (two) times daily.  Disp: 5 mL Rfl: 2   Atorvastatin Calcium 40 MG Oral Tab TK 1 T PO D Disp:  Rfl: 0   glipiZIDE 5 MG Oral Tab TK 1 T PO QAM BEFORE BREAKFAST Disp:  Rfl: 4   nap non-tender and atraumatic with the exception of their lumbar spine and right ankle. The patient's skin is intact and compartments are soft. The patient's lower extremities are warm and pink with brisk capillary refill and 2+ distal pulses.   Sensation i weightbearing as tolerated on the right lower extremity after surgery. The patient will benefit from being evaluated by Dr. Michell Stringer for degenerative disc disease of the lumbar spine at this time.     The risks, benefits, and alternatives of surgical dominic

## 2017-07-10 NOTE — BRIEF OP NOTE
Pre-Operative Diagnosis: right ankle painful hardware     Post-Operative Diagnosis: right ankle painful hardware     Procedure Performed:   Procedure(s):  removal of hardware right ankle     Surgeon(s) and Role:     Anthony Caceres MD - Primary

## 2017-07-10 NOTE — INTERVAL H&P NOTE
Pre-op Diagnosis: right ankle painful hardware    The above referenced H&P was reviewed by Júnior Lee MD on 7/10/2017, the patient was examined and no significant changes have occurred in the patient's condition since the H&P was performed.   I dis

## 2017-07-10 NOTE — ANESTHESIA POSTPROCEDURE EVALUATION
Patient: Jose Rico    Procedure Summary     Date:  07/10/17 Room / Location:  [facility] MAIN OR 02 / [facility] MAIN OR    Anesthesia Start:  0886 Anesthesia Stop:  0132    Procedure:  EXTREMITY LOWER HARDWARE REMOVAL (Right ) Diagnosis:  (right ankle painful h

## 2017-07-10 NOTE — OPERATIVE REPORT
[facility]    PATIENT'S NAME: Ermias Shirley   ATTENDING PHYSICIAN: Merary Ireland MD   OPERATING PHYSICIAN: Merary Ireland MD   PATIENT ACCOUNT#:   [id number]    LOCATION:  [place] [address]  MEDICAL RECORD #:   [id number]       DATE fashion. Perioperative antibiotics were infused. Confirmation of identity and laterality took place. Time-out was performed. The tourniquet was insufflated to 300 mmHg. An incision was made over the old lateral incision over the distal fibula.   Inci questions or concerns. She will do ankle pumps in order to decrease the risk of obtaining a DVT.     Dictated By Jarad Álvarez MD  d: 07/10/2017 16:14:10  t: 07/10/2017 18:03:31  [facility] [id number]  GD/

## 2017-07-13 ENCOUNTER — TELEPHONE (OUTPATIENT)
Dept: ORTHOPEDICS CLINIC | Facility: CLINIC | Age: 52
End: 2017-07-13

## 2017-07-13 NOTE — TELEPHONE ENCOUNTER
Took a shower and got her dressing  Wet. Wants to know if they can change the dressing. 1st post op appointment on July 25th.    Please advise

## 2017-07-18 ENCOUNTER — OFFICE VISIT (OUTPATIENT)
Dept: INTERNAL MEDICINE CLINIC | Facility: CLINIC | Age: 52
End: 2017-07-18

## 2017-07-18 VITALS
OXYGEN SATURATION: 98 % | DIASTOLIC BLOOD PRESSURE: 62 MMHG | BODY MASS INDEX: 40.4 KG/M2 | SYSTOLIC BLOOD PRESSURE: 120 MMHG | HEART RATE: 70 BPM | WEIGHT: 228 LBS | HEIGHT: 63 IN

## 2017-07-18 DIAGNOSIS — E11.42 CONTROLLED TYPE 2 DIABETES MELLITUS WITH DIABETIC POLYNEUROPATHY, WITHOUT LONG-TERM CURRENT USE OF INSULIN (HCC): ICD-10-CM

## 2017-07-18 DIAGNOSIS — E78.2 MIXED HYPERLIPIDEMIA: ICD-10-CM

## 2017-07-18 DIAGNOSIS — M62.838 MUSCLE SPASM OF RIGHT LOWER EXTREMITY: ICD-10-CM

## 2017-07-18 DIAGNOSIS — Z98.890 STATUS POST SURGERY: Primary | ICD-10-CM

## 2017-07-18 DIAGNOSIS — M54.31 SCIATICA, RIGHT SIDE: ICD-10-CM

## 2017-07-18 DIAGNOSIS — M50.90 CERVICAL BACK PAIN WITH EVIDENCE OF DISC DISEASE: ICD-10-CM

## 2017-07-18 PROCEDURE — 99214 OFFICE O/P EST MOD 30 MIN: CPT | Performed by: FAMILY MEDICINE

## 2017-07-18 RX ORDER — ATORVASTATIN CALCIUM 40 MG/1
TABLET, FILM COATED ORAL
COMMUNITY
Start: 2017-03-15 | End: 2018-02-06

## 2017-07-18 RX ORDER — CYCLOBENZAPRINE HCL 10 MG
10 TABLET ORAL 3 TIMES DAILY PRN
Qty: 90 TABLET | Refills: 1 | Status: SHIPPED | OUTPATIENT
Start: 2017-07-18 | End: 2018-02-06 | Stop reason: ALTCHOICE

## 2017-07-18 RX ORDER — NICOTINE POLACRILEX 4 MG/1
20 GUM, CHEWING ORAL
COMMUNITY
Start: 2015-03-31 | End: 2018-06-01

## 2017-07-18 RX ORDER — IBUPROFEN 600 MG/1
600 TABLET ORAL EVERY 8 HOURS PRN
Qty: 90 TABLET | Refills: 1 | Status: SHIPPED | OUTPATIENT
Start: 2017-07-18 | End: 2018-08-20

## 2017-07-18 RX ORDER — HYDROCODONE BITARTRATE AND ACETAMINOPHEN 5; 325 MG/1; MG/1
1 TABLET ORAL EVERY 6 HOURS PRN
Qty: 90 TABLET | Refills: 0 | Status: SHIPPED | OUTPATIENT
Start: 2017-07-18 | End: 2017-07-28

## 2017-07-18 RX ORDER — GABAPENTIN 600 MG/1
600 TABLET ORAL
COMMUNITY
Start: 2017-04-25 | End: 2017-07-27

## 2017-07-18 RX ORDER — GLIPIZIDE 5 MG/1
5 TABLET ORAL
COMMUNITY
Start: 2017-06-29 | End: 2018-02-06

## 2017-07-19 NOTE — PROGRESS NOTES
HPI:   Obdulio Phillip is a 45year old female who presents for recheck of her diabetes AND FOLLOWING UP ON RIGHT ANKLE SURGERY (PLATE AND SCREWS REMOVED) 8 DAYS AGO.   DM dx at age:  11 YEARS AGO  Current medications:   Current Outpatient Prescriptions: problems:  Patient Active Problem List:     Osteoarthritis of both knees     Controlled type 2 diabetes mellitus with diabetic polyneuropathy, without long-term current use of insulin ([address]     Mixed hyperlipidemia     Diabetic neuropathy ([address]     Hearing tenderness  EXTREMITIES: no cyanosis, clubbing or edema, pedal pulse 2+/4 bilaterally  NEURO: sensation is intact to monofilament   Bilateral barefoot skin diabetic exam is normal, visualized feet and the appearance is normal.  Bilateral monofilament/sensa recommended yearly    The patient indicates understanding of these issues and agrees to the plan. The patient is asked to return in      . No orders of the defined types were placed in this encounter.       Meds & Refills for this Visit:  Signed Prescript

## 2017-07-25 ENCOUNTER — HOSPITAL ENCOUNTER (OUTPATIENT)
Dept: GENERAL RADIOLOGY | Facility: HOSPITAL | Age: 52
Discharge: HOME | End: 2017-07-25
Attending: ORTHOPAEDIC SURGERY
Payer: COMMERCIAL

## 2017-07-25 ENCOUNTER — OFFICE VISIT (OUTPATIENT)
Dept: ORTHOPEDICS CLINIC | Facility: CLINIC | Age: 52
End: 2017-07-25

## 2017-07-25 VITALS — RESPIRATION RATE: 16 BRPM | DIASTOLIC BLOOD PRESSURE: 72 MMHG | HEART RATE: 72 BPM | SYSTOLIC BLOOD PRESSURE: 128 MMHG

## 2017-07-25 DIAGNOSIS — M25.571 ACUTE RIGHT ANKLE PAIN: ICD-10-CM

## 2017-07-25 DIAGNOSIS — Z47.89 ORTHOPEDIC AFTERCARE: ICD-10-CM

## 2017-07-25 DIAGNOSIS — T85.848A PAIN FROM IMPLANTED HARDWARE, INITIAL ENCOUNTER: Primary | ICD-10-CM

## 2017-07-25 PROCEDURE — 99212 OFFICE O/P EST SF 10 MIN: CPT | Performed by: ORTHOPAEDIC SURGERY

## 2017-07-25 PROCEDURE — 73610 X-RAY EXAM OF ANKLE: CPT | Performed by: ORTHOPAEDIC SURGERY

## 2017-07-25 PROCEDURE — 99024 POSTOP FOLLOW-UP VISIT: CPT | Performed by: ORTHOPAEDIC SURGERY

## 2017-07-25 NOTE — PROGRESS NOTES
This is a pleasant 52-year-old female that is 2 weeks status post removal of hardware from the right distal fibula. Patient has had no fevers, chills, or markers of infection. Patient returns today for repeat evaluation.   Patient has been weightbearing a

## 2017-07-25 NOTE — PROGRESS NOTES
Per verbal order from Dr. Jeferson Carrera remove patients sutures. sutures were removed and incision looks well approximated and no redness or discharge noted.

## 2017-07-27 ENCOUNTER — OFFICE VISIT (OUTPATIENT)
Dept: INTERNAL MEDICINE CLINIC | Facility: CLINIC | Age: 52
End: 2017-07-27

## 2017-07-27 VITALS
DIASTOLIC BLOOD PRESSURE: 68 MMHG | HEART RATE: 76 BPM | RESPIRATION RATE: 18 BRPM | BODY MASS INDEX: 41.29 KG/M2 | WEIGHT: 233 LBS | SYSTOLIC BLOOD PRESSURE: 124 MMHG | HEIGHT: 63 IN | OXYGEN SATURATION: 98 %

## 2017-07-27 DIAGNOSIS — M43.6 LEFT TORTICOLLIS: Primary | ICD-10-CM

## 2017-07-27 DIAGNOSIS — H10.9 CONJUNCTIVITIS OF BOTH EYES, UNSPECIFIED CONJUNCTIVITIS TYPE: ICD-10-CM

## 2017-07-27 DIAGNOSIS — M51.16 LUMBAR DISC DISEASE WITH RADICULOPATHY: ICD-10-CM

## 2017-07-27 PROCEDURE — 99214 OFFICE O/P EST MOD 30 MIN: CPT | Performed by: FAMILY MEDICINE

## 2017-07-27 RX ORDER — GABAPENTIN 600 MG/1
600 TABLET ORAL 3 TIMES DAILY PRN
Qty: 90 TABLET | Refills: 1 | Status: SHIPPED | OUTPATIENT
Start: 2017-07-27 | End: 2018-11-26

## 2017-07-27 RX ORDER — TOBRAMYCIN 3 MG/ML
1 SOLUTION/ DROPS OPHTHALMIC EVERY 4 HOURS
Qty: 5 ML | Refills: 0 | Status: SHIPPED | OUTPATIENT
Start: 2017-07-27 | End: 2018-02-06 | Stop reason: ALTCHOICE

## 2017-07-28 NOTE — PROGRESS NOTES
CC:  Pain (Pt presents to clinic for c/o left facial pain including jaw, ear and eye x 2 days.  Taking Norco for recent right ankle hardware removal. )      Hx of CC:  2 DAYS WITH LEFT SIDED NECK AND EAR PAIN.  ALSO WITH PERSISTENT BILATERAL LEG BURNING SEN types were placed in this encounter.

## 2017-08-01 RX ORDER — ASPIRIN 81 MG/1
81 TABLET ORAL DAILY
Qty: 90 TABLET | Refills: 3 | Status: SHIPPED | OUTPATIENT
Start: 2017-08-01 | End: 2018-11-09

## 2017-08-01 RX ORDER — LISINOPRIL 2.5 MG/1
TABLET ORAL
Qty: 30 TABLET | Refills: 0 | Status: SHIPPED | OUTPATIENT
Start: 2017-08-01 | End: 2017-08-29

## 2017-08-16 ENCOUNTER — OFFICE VISIT (OUTPATIENT)
Dept: PHYSICAL THERAPY | Age: 52
End: 2017-08-16
Attending: ORTHOPAEDIC SURGERY
Payer: COMMERCIAL

## 2017-08-16 DIAGNOSIS — T85.848A PAIN FROM IMPLANTED HARDWARE, INITIAL ENCOUNTER: ICD-10-CM

## 2017-08-16 DIAGNOSIS — Z47.89 ORTHOPEDIC AFTERCARE: ICD-10-CM

## 2017-08-16 DIAGNOSIS — M25.571 ACUTE RIGHT ANKLE PAIN: ICD-10-CM

## 2017-08-16 PROCEDURE — 97162 PT EVAL MOD COMPLEX 30 MIN: CPT

## 2017-08-16 NOTE — PROGRESS NOTES
P.T. EVALUATION:   Referring Physician: Dr. Jesus Seals  Diagnosis:  Pain from implanted hardware, initial encounter (J89.214B)  Acute right ankle pain (M25.571)  Orthopedic aftercare (Z47.89)  Date of Onset: Rehana 10, 2017 Date of Service: 8/16/2017     ROBBY 4+/5  Knee flx: R 5/5, L 4+/5  Ankle df: R 4+/5, L 5/5  Ankle pf: R 4/5, L 4+/5  Ankle inv: R 4+/5, L 4+/5   Ankle ev: R 4+/5, L 4/5     Balance: not tested this session    Gait: noted wide VINICIUS    Today’s Treatment and Response:  Patient education provided

## 2017-08-18 ENCOUNTER — OFFICE VISIT (OUTPATIENT)
Dept: PHYSICAL THERAPY | Age: 52
End: 2017-08-18
Attending: ORTHOPAEDIC SURGERY
Payer: COMMERCIAL

## 2017-08-18 DIAGNOSIS — T85.848A PAIN FROM IMPLANTED HARDWARE, INITIAL ENCOUNTER: ICD-10-CM

## 2017-08-18 DIAGNOSIS — M25.571 ACUTE RIGHT ANKLE PAIN: ICD-10-CM

## 2017-08-18 DIAGNOSIS — Z47.89 ORTHOPEDIC AFTERCARE: ICD-10-CM

## 2017-08-18 PROCEDURE — 97110 THERAPEUTIC EXERCISES: CPT

## 2017-08-18 NOTE — PROGRESS NOTES
Diagnosis: Pain from implanted hardware, initial encounter (W17.196C)  Acute right ankle pain (M25.571)  Orthopedic aftercare (Z47.89)    Authorized # of Visits:  2/7 (Julianna Campbell)       Next MD visit: none scheduled  Fall Risk: standard         Precautions

## 2017-08-22 ENCOUNTER — HOSPITAL ENCOUNTER (OUTPATIENT)
Dept: GENERAL RADIOLOGY | Facility: HOSPITAL | Age: 52
Discharge: HOME | End: 2017-08-22
Attending: ORTHOPAEDIC SURGERY
Payer: COMMERCIAL

## 2017-08-22 ENCOUNTER — OFFICE VISIT (OUTPATIENT)
Dept: ORTHOPEDICS CLINIC | Facility: CLINIC | Age: 52
End: 2017-08-22

## 2017-08-22 DIAGNOSIS — Z47.89 ORTHOPEDIC AFTERCARE: ICD-10-CM

## 2017-08-22 DIAGNOSIS — T85.848A PAIN FROM IMPLANTED HARDWARE, INITIAL ENCOUNTER: Primary | ICD-10-CM

## 2017-08-22 PROCEDURE — 99212 OFFICE O/P EST SF 10 MIN: CPT | Performed by: ORTHOPAEDIC SURGERY

## 2017-08-22 PROCEDURE — 99024 POSTOP FOLLOW-UP VISIT: CPT | Performed by: ORTHOPAEDIC SURGERY

## 2017-08-22 PROCEDURE — 73610 X-RAY EXAM OF ANKLE: CPT | Performed by: ORTHOPAEDIC SURGERY

## 2017-08-22 NOTE — PROGRESS NOTES
This is a pleasant 57-year-old female that is 6 weeks status post removal of hardware from the right distal fibula. The patient has had no fevers, chills, or markers of infection. The patient returns today for repeat evaluation.     On exam, the patient's

## 2017-08-23 ENCOUNTER — OFFICE VISIT (OUTPATIENT)
Dept: PHYSICAL THERAPY | Age: 52
End: 2017-08-23
Attending: ORTHOPAEDIC SURGERY
Payer: COMMERCIAL

## 2017-08-23 DIAGNOSIS — M25.571 ACUTE RIGHT ANKLE PAIN: ICD-10-CM

## 2017-08-23 DIAGNOSIS — T85.848A PAIN FROM IMPLANTED HARDWARE, INITIAL ENCOUNTER: ICD-10-CM

## 2017-08-23 DIAGNOSIS — Z47.89 ORTHOPEDIC AFTERCARE: ICD-10-CM

## 2017-08-23 PROCEDURE — 97110 THERAPEUTIC EXERCISES: CPT

## 2017-08-23 NOTE — PROGRESS NOTES
Diagnosis: Pain from implanted hardware, initial encounter (C52.223Q)  Acute right ankle pain (M25.571)  Orthopedic aftercare (Z47.89)    Authorized # of Visits:  3/7 (López Franz)       Next MD visit: none scheduled  Fall Risk: standard         Precautions

## 2017-08-29 RX ORDER — LISINOPRIL 2.5 MG/1
TABLET ORAL
Qty: 30 TABLET | Refills: 0 | Status: SHIPPED | OUTPATIENT
Start: 2017-08-29 | End: 2018-02-06

## 2017-08-30 ENCOUNTER — APPOINTMENT (OUTPATIENT)
Dept: PHYSICAL THERAPY | Age: 52
End: 2017-08-30
Attending: ORTHOPAEDIC SURGERY
Payer: COMMERCIAL

## 2017-08-31 ENCOUNTER — OFFICE VISIT (OUTPATIENT)
Dept: PHYSICAL THERAPY | Age: 52
End: 2017-08-31
Attending: ORTHOPAEDIC SURGERY
Payer: COMMERCIAL

## 2017-08-31 PROCEDURE — 97110 THERAPEUTIC EXERCISES: CPT

## 2017-08-31 NOTE — PROGRESS NOTES
Physical Therapy Discharge Summary    Pt has attended 4 visits in Physical Therapy.      Diagnosis: Pain from implanted hardware, initial encounter (O32.906L)  Acute right ankle pain (M25.571)  Orthopedic aftercare (Z47.89)    Authorized # of Visits:  4/7 ( include: prone hip ext, PPU, ROSEY, gastroc stretching with belt, standing B heel raises    Plan: d/c PT    Charges: ex 2       Total Timed Treatment: 33 min  Total Treatment Time: 38 min    Goals:   1- Pt will be I with maintenance and progression of HEP -

## 2017-09-01 ENCOUNTER — APPOINTMENT (OUTPATIENT)
Dept: PHYSICAL THERAPY | Age: 52
End: 2017-09-01
Attending: ORTHOPAEDIC SURGERY
Payer: COMMERCIAL

## 2017-09-02 DIAGNOSIS — J20.9 BRONCHITIS, ACUTE, WITH BRONCHOSPASM: ICD-10-CM

## 2017-09-05 RX ORDER — LISINOPRIL 2.5 MG/1
TABLET ORAL
Qty: 30 TABLET | Refills: 0 | Status: SHIPPED | OUTPATIENT
Start: 2017-09-05 | End: 2017-10-03

## 2017-09-06 ENCOUNTER — APPOINTMENT (OUTPATIENT)
Dept: PHYSICAL THERAPY | Age: 52
End: 2017-09-06
Attending: ORTHOPAEDIC SURGERY
Payer: COMMERCIAL

## 2017-09-17 DIAGNOSIS — J20.9 BRONCHITIS, ACUTE, WITH BRONCHOSPASM: ICD-10-CM

## 2017-10-03 RX ORDER — LISINOPRIL 2.5 MG/1
TABLET ORAL
Qty: 30 TABLET | Refills: 0 | Status: SHIPPED | OUTPATIENT
Start: 2017-10-03 | End: 2018-02-06

## 2017-10-12 RX ORDER — LISINOPRIL 2.5 MG/1
TABLET ORAL
Qty: 30 TABLET | Refills: 0 | Status: SHIPPED | OUTPATIENT
Start: 2017-10-12 | End: 2017-11-12

## 2017-10-21 DIAGNOSIS — M54.31 SCIATICA, RIGHT SIDE: Primary | ICD-10-CM

## 2017-10-21 DIAGNOSIS — M51.16 LUMBAR DISC DISEASE WITH RADICULOPATHY: ICD-10-CM

## 2017-10-21 DIAGNOSIS — M17.0 PRIMARY OSTEOARTHRITIS OF BOTH KNEES: ICD-10-CM

## 2017-10-21 RX ORDER — ACETAMINOPHEN AND CODEINE PHOSPHATE 300; 30 MG/1; MG/1
1 TABLET ORAL EVERY 6 HOURS PRN
Qty: 100 TABLET | Refills: 0 | Status: SHIPPED | OUTPATIENT
Start: 2017-10-21 | End: 2018-02-06

## 2017-10-23 ENCOUNTER — TELEPHONE (OUTPATIENT)
Dept: INTERNAL MEDICINE CLINIC | Facility: CLINIC | Age: 52
End: 2017-10-23

## 2017-11-13 RX ORDER — LISINOPRIL 2.5 MG/1
TABLET ORAL
Qty: 30 TABLET | Refills: 0 | Status: SHIPPED | OUTPATIENT
Start: 2017-11-13 | End: 2018-03-30

## 2017-12-02 DIAGNOSIS — J20.9 BRONCHITIS, ACUTE, WITH BRONCHOSPASM: ICD-10-CM

## 2017-12-02 RX ORDER — ALBUTEROL SULFATE 90 UG/1
AEROSOL, METERED RESPIRATORY (INHALATION)
Qty: 18 G | Refills: 1 | Status: SHIPPED | OUTPATIENT
Start: 2017-12-02 | End: 2018-04-05

## 2017-12-07 RX ORDER — METFORMIN HYDROCHLORIDE 500 MG/1
TABLET, EXTENDED RELEASE ORAL
Refills: 11 | COMMUNITY
Start: 2017-11-13 | End: 2018-04-05 | Stop reason: DRUGHIGH

## 2017-12-07 RX ORDER — GABAPENTIN 300 MG/1
CAPSULE ORAL
Refills: 11 | COMMUNITY
Start: 2017-11-13 | End: 2018-04-05 | Stop reason: DRUGHIGH

## 2017-12-07 RX ORDER — OMEPRAZOLE 20 MG/1
CAPSULE, DELAYED RELEASE ORAL
Refills: 3 | COMMUNITY
Start: 2017-11-13 | End: 2018-08-20

## 2018-02-06 ENCOUNTER — OFFICE VISIT (OUTPATIENT)
Dept: INTERNAL MEDICINE CLINIC | Facility: CLINIC | Age: 53
End: 2018-02-06

## 2018-02-06 VITALS
SYSTOLIC BLOOD PRESSURE: 130 MMHG | HEART RATE: 86 BPM | BODY MASS INDEX: 40.57 KG/M2 | WEIGHT: 229 LBS | OXYGEN SATURATION: 98 % | RESPIRATION RATE: 17 BRPM | DIASTOLIC BLOOD PRESSURE: 84 MMHG | HEIGHT: 63 IN

## 2018-02-06 DIAGNOSIS — M54.31 SCIATICA, RIGHT SIDE: Primary | ICD-10-CM

## 2018-02-06 DIAGNOSIS — M51.16 LUMBAR DISC DISEASE WITH RADICULOPATHY: ICD-10-CM

## 2018-02-06 DIAGNOSIS — M17.11 PRIMARY OSTEOARTHRITIS OF RIGHT KNEE: ICD-10-CM

## 2018-02-06 PROCEDURE — 20610 DRAIN/INJ JOINT/BURSA W/O US: CPT | Performed by: FAMILY MEDICINE

## 2018-02-06 PROCEDURE — 99213 OFFICE O/P EST LOW 20 MIN: CPT | Performed by: FAMILY MEDICINE

## 2018-02-06 RX ORDER — METFORMIN HYDROCHLORIDE EXTENDED-RELEASE TABLETS 1000 MG/1
1000 TABLET, FILM COATED, EXTENDED RELEASE ORAL
COMMUNITY
Start: 2017-11-07 | End: 2018-04-05

## 2018-02-06 RX ORDER — NICOTINE POLACRILEX 4 MG/1
20 GUM, CHEWING ORAL
COMMUNITY
Start: 2017-11-07 | End: 2018-04-05

## 2018-02-06 RX ORDER — GLIPIZIDE 5 MG/1
5 TABLET ORAL
COMMUNITY
Start: 2017-11-07 | End: 2018-04-05

## 2018-02-06 RX ORDER — ACETAMINOPHEN AND CODEINE PHOSPHATE 300; 30 MG/1; MG/1
1 TABLET ORAL EVERY 6 HOURS PRN
Qty: 100 TABLET | Refills: 2 | Status: SHIPPED | OUTPATIENT
Start: 2018-02-06 | End: 2018-04-05

## 2018-02-06 RX ORDER — ATORVASTATIN CALCIUM 40 MG/1
40 TABLET, FILM COATED ORAL DAILY
COMMUNITY
Start: 2017-11-07 | End: 2018-11-26

## 2018-02-06 RX ORDER — TRIAMCINOLONE ACETONIDE 40 MG/ML
40 INJECTION, SUSPENSION INTRA-ARTICULAR; INTRAMUSCULAR ONCE
Status: COMPLETED | OUTPATIENT
Start: 2018-02-06 | End: 2018-02-07

## 2018-02-07 NOTE — PROGRESS NOTES
CC:  Knee Pain (Pt presents to clinic for follow up on knee pain and b/l leg pain-->ongoing. )      Hx of CC:  CHRONIC LOW BACK PAIN WITH RADIATION/BURNING SENSATION INTO BUTTOCKS AND THIGHS--WORSE ON RIGHT SIDE.   PRESENT OVER PAST TWO YEARS, GRADUALLY WOR PHYSIATRY - INTERNAL  No orders of the defined types were placed in this encounter.

## 2018-03-30 RX ORDER — LISINOPRIL 2.5 MG/1
TABLET ORAL
Qty: 30 TABLET | Refills: 5 | Status: SHIPPED | OUTPATIENT
Start: 2018-03-30 | End: 2018-10-21

## 2018-04-05 ENCOUNTER — OFFICE VISIT (OUTPATIENT)
Dept: INTERNAL MEDICINE CLINIC | Facility: CLINIC | Age: 53
End: 2018-04-05

## 2018-04-05 VITALS
BODY MASS INDEX: 41.11 KG/M2 | RESPIRATION RATE: 18 BRPM | HEART RATE: 69 BPM | OXYGEN SATURATION: 97 % | TEMPERATURE: 99 F | WEIGHT: 232 LBS | SYSTOLIC BLOOD PRESSURE: 124 MMHG | HEIGHT: 63 IN | DIASTOLIC BLOOD PRESSURE: 66 MMHG

## 2018-04-05 DIAGNOSIS — M54.31 SCIATICA, RIGHT SIDE: ICD-10-CM

## 2018-04-05 DIAGNOSIS — Z91.09 ENVIRONMENTAL ALLERGIES: ICD-10-CM

## 2018-04-05 DIAGNOSIS — M51.16 LUMBAR DISC DISEASE WITH RADICULOPATHY: ICD-10-CM

## 2018-04-05 DIAGNOSIS — Z12.39 BREAST SCREENING: ICD-10-CM

## 2018-04-05 DIAGNOSIS — J45.21 MILD INTERMITTENT ASTHMA WITH ACUTE EXACERBATION: Primary | ICD-10-CM

## 2018-04-05 DIAGNOSIS — E11.42 CONTROLLED TYPE 2 DIABETES MELLITUS WITH DIABETIC POLYNEUROPATHY, WITHOUT LONG-TERM CURRENT USE OF INSULIN (HCC): ICD-10-CM

## 2018-04-05 PROCEDURE — 99214 OFFICE O/P EST MOD 30 MIN: CPT | Performed by: FAMILY MEDICINE

## 2018-04-05 RX ORDER — MONTELUKAST SODIUM 10 MG/1
10 TABLET ORAL NIGHTLY
Qty: 90 TABLET | Refills: 1 | Status: SHIPPED | OUTPATIENT
Start: 2018-04-05 | End: 2018-08-21

## 2018-04-05 RX ORDER — PREDNISONE 10 MG/1
10 TABLET ORAL DAILY
Qty: 10 TABLET | Refills: 0 | Status: SHIPPED | OUTPATIENT
Start: 2018-04-05 | End: 2018-06-01 | Stop reason: ALTCHOICE

## 2018-04-05 RX ORDER — GLIPIZIDE 5 MG/1
5 TABLET ORAL
Qty: 60 TABLET | Refills: 5 | Status: SHIPPED | OUTPATIENT
Start: 2018-04-05 | End: 2018-11-09

## 2018-04-05 RX ORDER — ACETAMINOPHEN AND CODEINE PHOSPHATE 300; 30 MG/1; MG/1
1 TABLET ORAL EVERY 6 HOURS PRN
Qty: 100 TABLET | Refills: 2 | Status: SHIPPED | OUTPATIENT
Start: 2018-04-05 | End: 2018-08-20 | Stop reason: ALTCHOICE

## 2018-04-06 NOTE — PROGRESS NOTES
CC:  Cough (Pt c/o cough and would like meds states cough has been active for a few weeks Denies NVD or fever but states at night cough is more active and  feels congested has needed an inhaler ) and Referral (requesting mammogram order and referral for OP WITH CODEINE #3) 300-30 MG Oral Tab; Take 1 tablet by mouth every 6 (six) hours as needed for Pain. Dispense: 100 tablet; Refill: 2    6.  Lumbar disc disease with radiculopathy  CONTINUE GABAPENTIN  - Acetaminophen-Codeine (TYLENOL WITH CODEINE #3) 300-30

## 2018-06-01 ENCOUNTER — OFFICE VISIT (OUTPATIENT)
Dept: INTERNAL MEDICINE CLINIC | Facility: CLINIC | Age: 53
End: 2018-06-01

## 2018-06-01 VITALS
DIASTOLIC BLOOD PRESSURE: 68 MMHG | BODY MASS INDEX: 40.75 KG/M2 | WEIGHT: 230 LBS | RESPIRATION RATE: 18 BRPM | SYSTOLIC BLOOD PRESSURE: 122 MMHG | HEIGHT: 63 IN | OXYGEN SATURATION: 98 % | HEART RATE: 75 BPM

## 2018-06-01 DIAGNOSIS — M54.31 SCIATICA, RIGHT SIDE: ICD-10-CM

## 2018-06-01 DIAGNOSIS — R60.0 LOWER LEG EDEMA: ICD-10-CM

## 2018-06-01 DIAGNOSIS — F32.9 REACTIVE DEPRESSION: ICD-10-CM

## 2018-06-01 DIAGNOSIS — Z12.39 BREAST CANCER SCREENING: ICD-10-CM

## 2018-06-01 DIAGNOSIS — M62.830 BACK MUSCLE SPASM: Primary | ICD-10-CM

## 2018-06-01 PROCEDURE — 99214 OFFICE O/P EST MOD 30 MIN: CPT | Performed by: FAMILY MEDICINE

## 2018-06-01 RX ORDER — ESCITALOPRAM OXALATE 10 MG/1
10 TABLET ORAL DAILY
Qty: 30 TABLET | Refills: 5 | Status: SHIPPED | OUTPATIENT
Start: 2018-06-01 | End: 2018-08-20

## 2018-06-01 RX ORDER — CYCLOBENZAPRINE HCL 10 MG
10 TABLET ORAL 3 TIMES DAILY PRN
Qty: 50 TABLET | Refills: 1 | Status: SHIPPED | OUTPATIENT
Start: 2018-06-01 | End: 2018-06-21

## 2018-06-01 RX ORDER — FUROSEMIDE 20 MG/1
20 TABLET ORAL DAILY PRN
Qty: 30 TABLET | Refills: 2 | Status: SHIPPED | OUTPATIENT
Start: 2018-06-01 | End: 2019-02-13

## 2018-06-01 NOTE — PROGRESS NOTES
CC:  Pain (Pt c/o right sided lef pain and neck pain x 2 wks. )      Hx of CC:  CHRONIC LOW BACK AND RIGHT BUTTOCK/LEG PAINS--SOME RELIEF WITH GABAPENTIN. HAS APPT WITH DR. Luann Boyd COMING UP.  OVER PAST 2 WEEKS BILATERAL SHOULDER/NECK PAINS PRESENT.     LESLY Future    5.  Sciatica, right side  FAILED PHYSICAL THERAPY COURSE  CONSIDER STEROID INJECTION (WILL SEE DR. Yuli Jfafe)  IF ALL ELSE FAILS WOULD SEND TO NEUROSURGERY    FREDY SCREENING BILAT (CPT=77067)  No orders of the defined types were placed in this encounte

## 2018-06-05 ENCOUNTER — TELEPHONE (OUTPATIENT)
Dept: INTERNAL MEDICINE CLINIC | Facility: CLINIC | Age: 53
End: 2018-06-05

## 2018-06-05 NOTE — TELEPHONE ENCOUNTER
Unable to reach pt. Left message in Sri Lankan to return our call or request refill request to be sent to us from pharmacy.

## 2018-08-20 ENCOUNTER — OFFICE VISIT (OUTPATIENT)
Dept: NEUROLOGY | Facility: CLINIC | Age: 53
End: 2018-08-20
Payer: MEDICAID

## 2018-08-20 ENCOUNTER — TELEPHONE (OUTPATIENT)
Dept: NEUROLOGY | Facility: CLINIC | Age: 53
End: 2018-08-20

## 2018-08-20 VITALS
BODY MASS INDEX: 40.75 KG/M2 | WEIGHT: 230 LBS | HEART RATE: 63 BPM | RESPIRATION RATE: 15 BRPM | DIASTOLIC BLOOD PRESSURE: 64 MMHG | HEIGHT: 63 IN | SYSTOLIC BLOOD PRESSURE: 114 MMHG

## 2018-08-20 DIAGNOSIS — M43.16 SPONDYLOLISTHESIS OF LUMBAR REGION: ICD-10-CM

## 2018-08-20 DIAGNOSIS — G89.29 CHRONIC BILATERAL LOW BACK PAIN WITH RIGHT-SIDED SCIATICA: ICD-10-CM

## 2018-08-20 DIAGNOSIS — M54.41 CHRONIC BILATERAL LOW BACK PAIN WITH RIGHT-SIDED SCIATICA: ICD-10-CM

## 2018-08-20 DIAGNOSIS — M54.16 LUMBAR RADICULOPATHY: Primary | ICD-10-CM

## 2018-08-20 PROCEDURE — 99244 OFF/OP CNSLTJ NEW/EST MOD 40: CPT | Performed by: PHYSICAL MEDICINE & REHABILITATION

## 2018-08-20 RX ORDER — CYCLOBENZAPRINE HCL 10 MG
10 TABLET ORAL 3 TIMES DAILY PRN
Refills: 1 | COMMUNITY
Start: 2018-06-01 | End: 2018-08-20

## 2018-08-20 RX ORDER — METFORMIN HYDROCHLORIDE 500 MG/1
TABLET, EXTENDED RELEASE ORAL
Refills: 9 | COMMUNITY
Start: 2018-07-31 | End: 2018-08-20 | Stop reason: DRUGHIGH

## 2018-08-20 RX ORDER — GABAPENTIN 300 MG/1
CAPSULE ORAL
Refills: 11 | COMMUNITY
Start: 2018-07-31 | End: 2019-05-18

## 2018-08-20 RX ORDER — CYCLOBENZAPRINE HCL 10 MG
10 TABLET ORAL 3 TIMES DAILY PRN
Qty: 90 TABLET | Refills: 1 | Status: SHIPPED | OUTPATIENT
Start: 2018-08-20 | End: 2019-05-03

## 2018-08-20 NOTE — PATIENT INSTRUCTIONS
As of October 6th 2014, the Drug Enforcement Agency [facility]) is reclassifying all hydrocodone combination medications from Schedule III to Schedule II. This includes medications such as Norco, Vicodin, Lortab, Zohydro, and Vicoprofen.     What this means for y will get lumbar spine flexion and extension x-rays. She will do PT on the lumbar spine. She will get a MRI of the lumbar spine if the PT is not helping her.     She will call my office after she has done 6 sessions of the PT to let me know how she is

## 2018-08-20 NOTE — TELEPHONE ENCOUNTER
Nacho for [facility] BS Online for authorization of approval for MRI L-spine wo. Approval was given with Authorization # [id number] effective 08/20/18 to 10/04/18. Will call Pt. To inform. L/m advising of approval. Can proceed with scheduling appt.

## 2018-08-20 NOTE — PROGRESS NOTES
Low Back Pain H & P    Chief Complaint:  Patient presents with:  Low Back Pain: new patient presents with Lenoard Leyden daughter who interprets for patient. pt c/o 3 yr hx of low back pain radiating in both buttocks down the legs that worsened 11/2017.  pain is wor complication, not stated as uncontrolled 2008       Past Surgical History   Past Surgical History:  No date: ANESTHESIA FOR EAR SURGERY Right  5/2006: CARPAL TUNNEL RELEASE Left  No date: FOOT SURGERY Right  No date: HAND/FINGER SURGERY UNLISTED Left  No d oriented x 3. The patient has a normal affect and mood. Respiratory:  No acute respiratory distress. Patient does not have a cough. HEENT:  Extraocular muscles are intact. There is no kern icterus. Pupils are equal, round, and reactive to light.  Casilda Koyanagi low back pain and right posterior thigh pain   LEFT hip flexion Positive left low back pain   RIGHT hip SAM test Positive for right low back pain   LEFT hip SAM test Positive for bilateral low back pain   RIGHT hip internal rotation Positive for right

## 2018-08-21 ENCOUNTER — OFFICE VISIT (OUTPATIENT)
Dept: INTERNAL MEDICINE CLINIC | Facility: CLINIC | Age: 53
End: 2018-08-21
Payer: MEDICAID

## 2018-08-21 VITALS
SYSTOLIC BLOOD PRESSURE: 120 MMHG | HEIGHT: 63 IN | OXYGEN SATURATION: 96 % | BODY MASS INDEX: 40.22 KG/M2 | DIASTOLIC BLOOD PRESSURE: 80 MMHG | HEART RATE: 61 BPM | WEIGHT: 227 LBS

## 2018-08-21 DIAGNOSIS — M54.16 LUMBAR RADICULOPATHY: Primary | ICD-10-CM

## 2018-08-21 DIAGNOSIS — J30.2 SEASONAL ALLERGIES: ICD-10-CM

## 2018-08-21 DIAGNOSIS — E11.9 TYPE 2 DIABETES MELLITUS WITHOUT COMPLICATION, WITHOUT LONG-TERM CURRENT USE OF INSULIN (HCC): ICD-10-CM

## 2018-08-21 DIAGNOSIS — M43.16 SPONDYLOLISTHESIS OF LUMBAR REGION: ICD-10-CM

## 2018-08-21 DIAGNOSIS — M54.31 SCIATICA, RIGHT SIDE: ICD-10-CM

## 2018-08-21 DIAGNOSIS — E78.2 MIXED HYPERLIPIDEMIA: ICD-10-CM

## 2018-08-21 DIAGNOSIS — M51.16 LUMBAR DISC DISEASE WITH RADICULOPATHY: ICD-10-CM

## 2018-08-21 DIAGNOSIS — Z91.09 ENVIRONMENTAL ALLERGIES: ICD-10-CM

## 2018-08-21 DIAGNOSIS — J45.21 MILD INTERMITTENT ASTHMA WITH ACUTE EXACERBATION: ICD-10-CM

## 2018-08-21 PROCEDURE — 99214 OFFICE O/P EST MOD 30 MIN: CPT | Performed by: FAMILY MEDICINE

## 2018-08-21 RX ORDER — ALBUTEROL SULFATE 90 UG/1
AEROSOL, METERED RESPIRATORY (INHALATION)
Qty: 18 G | Refills: 2 | Status: SHIPPED | OUTPATIENT
Start: 2018-08-21 | End: 2019-02-14

## 2018-08-21 RX ORDER — MONTELUKAST SODIUM 10 MG/1
10 TABLET ORAL NIGHTLY
Qty: 90 TABLET | Refills: 1 | Status: SHIPPED | OUTPATIENT
Start: 2018-08-21 | End: 2019-05-03

## 2018-08-21 RX ORDER — ACETAMINOPHEN AND CODEINE PHOSPHATE 300; 30 MG/1; MG/1
1 TABLET ORAL EVERY 6 HOURS PRN
Qty: 100 TABLET | Refills: 2 | Status: SHIPPED | OUTPATIENT
Start: 2018-08-21 | End: 2019-03-14

## 2018-08-22 PROCEDURE — 83036 HEMOGLOBIN GLYCOSYLATED A1C: CPT | Performed by: FAMILY MEDICINE

## 2018-08-22 PROCEDURE — 80061 LIPID PANEL: CPT | Performed by: FAMILY MEDICINE

## 2018-08-22 PROCEDURE — 80053 COMPREHEN METABOLIC PANEL: CPT | Performed by: FAMILY MEDICINE

## 2018-08-22 NOTE — PROGRESS NOTES
CC:  Medication Request      Hx of CC:  F/UP ON CHRONIC LOW BACK PAIN--RECENTLY SEEN BY DR. Taj Rock AND HAS REFERRAL FOR PHYSICAL THERAPY. KNEE PAINS \"OK\" AT PRESENT TIME.     GLUCOSE LEVELS IN THE 'S    Vitals:    08/21/18  1643   BP: 120/80   Puls Seasonal allergies  MONTELUKAST 10 MG Q HS    7. Mild intermittent asthma with acute exacerbation  VENTOLIN Q 6 HRS PRN COUGH  RESUME MONTELUKAST AT HS  - Beclomethasone Dipropionate (QVAR) 80 MCG/ACT Inhalation Aero Soln;  Inhale 2 puffs into the lungs 2 (

## 2018-08-28 DIAGNOSIS — J45.991 COUGH VARIANT ASTHMA: Primary | ICD-10-CM

## 2018-08-28 RX ORDER — FLUTICASONE PROPIONATE AND SALMETEROL 113; 14 UG/1; UG/1
1 POWDER, METERED RESPIRATORY (INHALATION) 2 TIMES DAILY PRN
Qty: 1 EACH | Refills: 3 | Status: SHIPPED | OUTPATIENT
Start: 2018-08-28 | End: 2019-02-13

## 2018-08-30 ENCOUNTER — HOSPITAL ENCOUNTER (OUTPATIENT)
Dept: MRI IMAGING | Facility: HOSPITAL | Age: 53
Discharge: HOME | End: 2018-08-30
Attending: PHYSICAL MEDICINE & REHABILITATION
Payer: MEDICAID

## 2018-08-30 DIAGNOSIS — M54.41 CHRONIC BILATERAL LOW BACK PAIN WITH RIGHT-SIDED SCIATICA: ICD-10-CM

## 2018-08-30 DIAGNOSIS — M54.16 LUMBAR RADICULOPATHY: ICD-10-CM

## 2018-08-30 DIAGNOSIS — M43.16 SPONDYLOLISTHESIS OF LUMBAR REGION: ICD-10-CM

## 2018-08-30 DIAGNOSIS — G89.29 CHRONIC BILATERAL LOW BACK PAIN WITH RIGHT-SIDED SCIATICA: ICD-10-CM

## 2018-08-30 PROCEDURE — 72148 MRI LUMBAR SPINE W/O DYE: CPT | Performed by: PHYSICAL MEDICINE & REHABILITATION

## 2018-09-11 ENCOUNTER — OFFICE VISIT (OUTPATIENT)
Dept: PHYSICAL THERAPY | Age: 53
End: 2018-09-11
Attending: FAMILY MEDICINE
Payer: MEDICAID

## 2018-09-11 DIAGNOSIS — M54.16 LUMBAR RADICULOPATHY: ICD-10-CM

## 2018-09-11 DIAGNOSIS — G89.29 CHRONIC BILATERAL LOW BACK PAIN WITH RIGHT-SIDED SCIATICA: ICD-10-CM

## 2018-09-11 DIAGNOSIS — M43.16 SPONDYLOLISTHESIS OF LUMBAR REGION: ICD-10-CM

## 2018-09-11 DIAGNOSIS — M54.41 CHRONIC BILATERAL LOW BACK PAIN WITH RIGHT-SIDED SCIATICA: ICD-10-CM

## 2018-09-11 PROCEDURE — 97530 THERAPEUTIC ACTIVITIES: CPT | Performed by: PHYSICAL THERAPIST

## 2018-09-11 PROCEDURE — 97112 NEUROMUSCULAR REEDUCATION: CPT | Performed by: PHYSICAL THERAPIST

## 2018-09-11 PROCEDURE — 97163 PT EVAL HIGH COMPLEX 45 MIN: CPT | Performed by: PHYSICAL THERAPIST

## 2018-09-11 NOTE — PROGRESS NOTES
LUMBAR SPINE/LOWER EXTREMITY EVALUATION:   Referring Physician: Landon Maldnoado MD    Diagnosis: Lumbar radiculopathy (M54.16)  Chronic bilateral low back pain with right-sided sciatica (M54.41,G89.29)  Spondylolisthesis of lumbar region (M43.16) Date of Fever or Chills Night pain at back, R leg and upper back at R   Lower Extremity Neurological Deficit N   Vision Changes/Double Vision N   Headaches N   Chest Pain or Palpitations, SOB N   Dizziness, weakness, numbness and tingling N   Depression Y; taking Eversion R: 3+ L: 4   Great Toe Extension R: 3   L: 4       Palpation: Moderate tenderness to palpation at lumbar vertebrae, erector spinae and QL. Moderate-marked increased soft tissue density.   Segmental Mobility Testing:   Lumbar: Hypomobility (moderate levels. Recommended frequency/duration: Patient will be seen for 2x/week or a total of 10 visits over a 90 day period.  Skilled intervention will include: Manual Therapy, Neuromuscular Re-education, Self-Care Home Management, Therapeutic Activities, Th

## 2018-09-17 ENCOUNTER — OFFICE VISIT (OUTPATIENT)
Dept: PHYSICAL THERAPY | Age: 53
End: 2018-09-17
Attending: FAMILY MEDICINE
Payer: MEDICAID

## 2018-09-17 PROCEDURE — 97110 THERAPEUTIC EXERCISES: CPT | Performed by: PHYSICAL THERAPIST

## 2018-09-17 PROCEDURE — 97112 NEUROMUSCULAR REEDUCATION: CPT | Performed by: PHYSICAL THERAPIST

## 2018-09-17 PROCEDURE — 97140 MANUAL THERAPY 1/> REGIONS: CPT | Performed by: PHYSICAL THERAPIST

## 2018-09-17 NOTE — PROGRESS NOTES
Name: Yoli Brown  Date: 9/17/2018  Dx:  Lumbar radiculopathy (M54.16)  Chronic bilateral low back pain with right-sided sciatica (M54.41,G89.29)      Authorized # of Visits:  12         Next MD visit: none scheduled  Fall Risk: standard         Prec mobility and return patient to prior level of function.       Charges: 1 MT, 1 NR, 1 TE    Total Timed Treatment: 45 min  Total Treatment Time: 45 min

## 2018-09-20 ENCOUNTER — OFFICE VISIT (OUTPATIENT)
Dept: PHYSICAL THERAPY | Age: 53
End: 2018-09-20
Attending: FAMILY MEDICINE
Payer: MEDICAID

## 2018-09-20 PROCEDURE — 97112 NEUROMUSCULAR REEDUCATION: CPT | Performed by: PHYSICAL THERAPIST

## 2018-09-20 PROCEDURE — 97110 THERAPEUTIC EXERCISES: CPT | Performed by: PHYSICAL THERAPIST

## 2018-09-20 PROCEDURE — 97140 MANUAL THERAPY 1/> REGIONS: CPT | Performed by: PHYSICAL THERAPIST

## 2018-09-20 NOTE — PROGRESS NOTES
Name: Mehnaz Michelle  Dx:  Lumbar radiculopathy (M54.16)  Chronic bilateral low back pain with right-sided sciatica (M54.41,G89.29)      Authorized # of Visits:  12         Next MD visit: none scheduled  Fall Risk: standard         Precautions: n/a allow community ambulation for >15 minutes. 4. Patient to improve FOTO outcomes score to less than or equal to 35% impairment, for functional improvement in ADLs. Currently 71% impaired. Reviewed goals with patient on 9/17/2018 .  Patient is in agreement

## 2018-09-24 ENCOUNTER — OFFICE VISIT (OUTPATIENT)
Dept: PHYSICAL THERAPY | Age: 53
End: 2018-09-24
Attending: FAMILY MEDICINE
Payer: MEDICAID

## 2018-09-24 PROCEDURE — 97140 MANUAL THERAPY 1/> REGIONS: CPT | Performed by: PHYSICAL THERAPIST

## 2018-09-24 PROCEDURE — 97110 THERAPEUTIC EXERCISES: CPT | Performed by: PHYSICAL THERAPIST

## 2018-09-24 PROCEDURE — 97112 NEUROMUSCULAR REEDUCATION: CPT | Performed by: PHYSICAL THERAPIST

## 2018-09-24 NOTE — PROGRESS NOTES
Name: Priyadebbybrenda aBron  Dx:  Lumbar radiculopathy (M54.16)  Chronic bilateral low back pain with right-sided sciatica (M54.41,G89.29)      Authorized # of Visits:  12         Next MD visit: none scheduled  Fall Risk: standard         Precautions: n/a independent with progressive HEP during episode of care and upon discharge to aide with symptom management and return to previous level of function.    2. Patient will increase spinal ROM to demonstrate mild loss of motion through movement screen for ease

## 2018-09-27 ENCOUNTER — OFFICE VISIT (OUTPATIENT)
Dept: PHYSICAL THERAPY | Age: 53
End: 2018-09-27
Attending: FAMILY MEDICINE
Payer: MEDICAID

## 2018-09-27 PROCEDURE — 97110 THERAPEUTIC EXERCISES: CPT | Performed by: PHYSICAL THERAPIST

## 2018-09-27 PROCEDURE — 97112 NEUROMUSCULAR REEDUCATION: CPT | Performed by: PHYSICAL THERAPIST

## 2018-09-27 NOTE — PROGRESS NOTES
Name: Brandon Vásquez  Dx:  Lumbar radiculopathy (M54.16)  Chronic bilateral low back pain with right-sided sciatica (M54.41,G89.29)      Authorized # of Visits:  12         Next MD visit: none scheduled  Fall Risk: standard         Precautions: n/a exercise rationale, and progression of activity at home. Patient's daughter verbalized understanding and discussed education with her mother. Goals:   Long Term Goals Timeframe (6 weeks, 12 visits over 90 days)  1.  Patient to be independent with progr

## 2018-10-01 ENCOUNTER — OFFICE VISIT (OUTPATIENT)
Dept: PHYSICAL THERAPY | Age: 53
End: 2018-10-01
Attending: FAMILY MEDICINE
Payer: MEDICAID

## 2018-10-01 PROCEDURE — 97110 THERAPEUTIC EXERCISES: CPT | Performed by: PHYSICAL THERAPIST

## 2018-10-01 PROCEDURE — 97112 NEUROMUSCULAR REEDUCATION: CPT | Performed by: PHYSICAL THERAPIST

## 2018-10-01 NOTE — PROGRESS NOTES
Name: Brandon Vásquez  Dx:  Lumbar radiculopathy (M54.16)  Chronic bilateral low back pain with right-sided sciatica (M54.41,G89.29)      Authorized # of Visits:  12         Next MD visit: none scheduled  Fall Risk: standard         Precautions: n/a supine TrA with hip add 2x15, 5 sec  - Balance toe taps: sagittal and frontal planes 1x15 each, bilaterally at barre   Therapeutic Activity  Exercise rationale; plan of care  Education on sleeping position in sidelying with pillows.                    Asses

## 2018-10-04 ENCOUNTER — TELEPHONE (OUTPATIENT)
Dept: PHYSICAL THERAPY | Age: 53
End: 2018-10-04

## 2018-10-04 ENCOUNTER — APPOINTMENT (OUTPATIENT)
Dept: PHYSICAL THERAPY | Age: 53
End: 2018-10-04
Attending: FAMILY MEDICINE
Payer: MEDICAID

## 2018-10-09 ENCOUNTER — OFFICE VISIT (OUTPATIENT)
Dept: PHYSICAL THERAPY | Age: 53
End: 2018-10-09
Attending: FAMILY MEDICINE
Payer: MEDICAID

## 2018-10-09 PROCEDURE — 97112 NEUROMUSCULAR REEDUCATION: CPT | Performed by: PHYSICAL THERAPIST

## 2018-10-09 PROCEDURE — 97110 THERAPEUTIC EXERCISES: CPT | Performed by: PHYSICAL THERAPIST

## 2018-10-09 PROCEDURE — 97530 THERAPEUTIC ACTIVITIES: CPT | Performed by: PHYSICAL THERAPIST

## 2018-10-09 NOTE — PROGRESS NOTES
Name: Harsh Phelps  Dx:  Lumbar radiculopathy (M54.16)  Chronic bilateral low back pain with right-sided sciatica (M54.41,G89.29)      Authorized # of Visits:  12         Next MD visit: none scheduled  Fall Risk: standard         Precautions: n/a her daughter regarding ongoing symptoms. Discussed plan to contact physician                  Assessment: She demonstrates ability to complete walking exercises without UE support which challenges her strength and functional mobility.  Patient's ongoing lev

## 2018-10-22 RX ORDER — LISINOPRIL 2.5 MG/1
TABLET ORAL
Qty: 90 TABLET | Refills: 5 | Status: SHIPPED | OUTPATIENT
Start: 2018-10-22 | End: 2019-05-18 | Stop reason: ALTCHOICE

## 2018-11-04 PROBLEM — M51.9 LUMBAR DISC DISEASE: Status: ACTIVE | Noted: 2018-11-04

## 2018-11-04 PROBLEM — M43.10 RETROLISTHESIS: Status: ACTIVE | Noted: 2018-11-04

## 2018-11-04 PROBLEM — M48.062 SPINAL STENOSIS OF LUMBAR REGION WITH NEUROGENIC CLAUDICATION: Status: ACTIVE | Noted: 2018-11-04

## 2018-11-04 PROBLEM — M48.061 LUMBAR FORAMINAL STENOSIS: Status: ACTIVE | Noted: 2018-11-04

## 2018-11-05 ENCOUNTER — TELEPHONE (OUTPATIENT)
Dept: NEUROLOGY | Facility: CLINIC | Age: 53
End: 2018-11-05

## 2018-11-05 NOTE — TELEPHONE ENCOUNTER
----- Message from Leyla Boswell MD sent at 11/4/2018  6:20 PM CST -----  She has significant bulging discs with varying degrees of stenosis. Please see how she has been doing with the PT. She is scheduled to follow up on 12/6/18.   If she is making prog

## 2018-11-09 DIAGNOSIS — E11.42 CONTROLLED TYPE 2 DIABETES MELLITUS WITH DIABETIC POLYNEUROPATHY, WITHOUT LONG-TERM CURRENT USE OF INSULIN (HCC): ICD-10-CM

## 2018-11-10 RX ORDER — GLIPIZIDE 5 MG/1
TABLET ORAL
Qty: 60 TABLET | Refills: 0 | Status: SHIPPED | OUTPATIENT
Start: 2018-11-10 | End: 2018-12-08

## 2018-11-10 RX ORDER — ASPIRIN 81 MG
TABLET, DELAYED RELEASE (ENTERIC COATED) ORAL
Qty: 90 TABLET | Refills: 0 | Status: SHIPPED | OUTPATIENT
Start: 2018-11-10 | End: 2019-05-03

## 2018-11-26 ENCOUNTER — TELEPHONE (OUTPATIENT)
Dept: INTERNAL MEDICINE CLINIC | Facility: CLINIC | Age: 53
End: 2018-11-26

## 2018-11-26 DIAGNOSIS — M51.16 LUMBAR DISC DISEASE WITH RADICULOPATHY: ICD-10-CM

## 2018-11-26 DIAGNOSIS — E11.42 CONTROLLED TYPE 2 DIABETES MELLITUS WITH DIABETIC POLYNEUROPATHY, WITHOUT LONG-TERM CURRENT USE OF INSULIN (HCC): Primary | ICD-10-CM

## 2018-11-26 DIAGNOSIS — E78.2 MIXED HYPERLIPIDEMIA: ICD-10-CM

## 2018-11-26 RX ORDER — ATORVASTATIN CALCIUM 40 MG/1
40 TABLET, FILM COATED ORAL DAILY
Qty: 30 TABLET | Refills: 5 | Status: SHIPPED | OUTPATIENT
Start: 2018-11-26 | End: 2019-05-18

## 2018-11-26 RX ORDER — GABAPENTIN 600 MG/1
600 TABLET ORAL 3 TIMES DAILY PRN
Qty: 90 TABLET | Refills: 2 | Status: SHIPPED | OUTPATIENT
Start: 2018-11-26 | End: 2019-02-13

## 2018-12-06 ENCOUNTER — OFFICE VISIT (OUTPATIENT)
Dept: NEUROLOGY | Facility: CLINIC | Age: 53
End: 2018-12-06
Payer: MEDICAID

## 2018-12-06 VITALS
HEART RATE: 80 BPM | DIASTOLIC BLOOD PRESSURE: 62 MMHG | BODY MASS INDEX: 39.34 KG/M2 | SYSTOLIC BLOOD PRESSURE: 120 MMHG | HEIGHT: 63 IN | WEIGHT: 222 LBS

## 2018-12-06 DIAGNOSIS — M54.16 LUMBAR RADICULOPATHY: Primary | ICD-10-CM

## 2018-12-06 DIAGNOSIS — M48.061 LUMBAR FORAMINAL STENOSIS: ICD-10-CM

## 2018-12-06 DIAGNOSIS — M54.41 CHRONIC BILATERAL LOW BACK PAIN WITH RIGHT-SIDED SCIATICA: ICD-10-CM

## 2018-12-06 DIAGNOSIS — M48.062 SPINAL STENOSIS OF LUMBAR REGION WITH NEUROGENIC CLAUDICATION: ICD-10-CM

## 2018-12-06 DIAGNOSIS — M43.16 SPONDYLOLISTHESIS OF LUMBAR REGION: ICD-10-CM

## 2018-12-06 DIAGNOSIS — M43.10 RETROLISTHESIS: ICD-10-CM

## 2018-12-06 DIAGNOSIS — G89.29 CHRONIC BILATERAL LOW BACK PAIN WITH RIGHT-SIDED SCIATICA: ICD-10-CM

## 2018-12-06 DIAGNOSIS — M51.9 LUMBAR DISC DISEASE: ICD-10-CM

## 2018-12-06 PROCEDURE — 99214 OFFICE O/P EST MOD 30 MIN: CPT | Performed by: PHYSICAL MEDICINE & REHABILITATION

## 2018-12-06 NOTE — PROGRESS NOTES
Low Back Pain H & P    Chief Complaint: Patient presents with:  Low Back Pain: Patient last seen on 8/20/18. F/U on lower back pain. Patient presents with daughter who translates. Patient completed PT with minimal relief.  Patient c/o pain in central lower History   Problem Relation Age of Onset   • Alcohol and Other Disorders Associated Father         Alcoholism       Social History   Social History    Socioeconomic History      Marital status:       Spouse name: Not on file      Number of children: lesions.     Vitals:   12/06/18  1235   BP: 120/62   Pulse: 80       Gait:    Gait: Normal gait   Sit to Stand: mild-moderate difficulty      Lumbar Spine:    Scoliosis: No scoliosis present     Lumbar Spine Palpation:    Spinous Processes: Tender at L3, L4 me know how the injection worked. The patient understands and agrees with the stated plan. Lilliam Baumann MD  12/6/2018

## 2018-12-06 NOTE — PATIENT INSTRUCTIONS
Plan  I will perform right L5 TFESI(s). The patient will continue with her home exercise program.    She will continue with the Advil for the pain and the Neurontin.     The patient will follow up in 3 months, but the patient will call me 2 weeks after

## 2018-12-08 DIAGNOSIS — E11.42 CONTROLLED TYPE 2 DIABETES MELLITUS WITH DIABETIC POLYNEUROPATHY, WITHOUT LONG-TERM CURRENT USE OF INSULIN (HCC): ICD-10-CM

## 2018-12-10 RX ORDER — GLIPIZIDE 5 MG/1
TABLET ORAL
Qty: 60 TABLET | Refills: 0 | Status: SHIPPED | OUTPATIENT
Start: 2018-12-10 | End: 2019-04-23

## 2018-12-12 ENCOUNTER — TELEPHONE (OUTPATIENT)
Dept: NEUROLOGY | Facility: CLINIC | Age: 53
End: 2018-12-12

## 2018-12-12 DIAGNOSIS — M54.16 LUMBAR RADICULOPATHY: Primary | ICD-10-CM

## 2018-12-12 NOTE — TELEPHONE ENCOUNTER
[employer] for authorization of approval for Right L5 TFESI CPT Code S5720999, [id number]. Case #[id number] pending review.

## 2018-12-12 NOTE — TELEPHONE ENCOUNTER
Fax received from [facility] authorizing CPT Code 57236, 08256 for Right L5 TFESI on Authorization #[id number] one unit/DOS 12-6-18 to 2-4-19.  Will inform nursing

## 2018-12-12 NOTE — TELEPHONE ENCOUNTER
EMMANUEL to schedule Right L5(L5-S1)transforaminal epidural steroid injections at [facility].

## 2018-12-13 NOTE — TELEPHONE ENCOUNTER
Spoke with  Mary Norwood. Asked that Luz Maria Hodges call to schedule TFESI, explained that there is limited appointment and she needs to call if procedure to be do this year. Ben understands will have Luz Maria Hodges call.

## 2018-12-17 ENCOUNTER — HOSPITAL ENCOUNTER (OUTPATIENT)
Facility: HOSPITAL | Age: 53
Setting detail: HOSPITAL OUTPATIENT SURGERY
End: 2018-12-17
Attending: PHYSICAL MEDICINE & REHABILITATION | Admitting: PHYSICAL MEDICINE & REHABILITATION
Payer: MEDICAID

## 2018-12-17 NOTE — TELEPHONE ENCOUNTER
Patient has been scheduled for a Right L5(L5-S1)transforaminal epidural steroid injections at [facility].    on 2/15/2019 at the [facility]. Medications and allergies reviewed.  Patient informed to hold aspirins, nsaids, blood thinners, vitamins and fish oils 3-7 days p

## 2019-01-05 ENCOUNTER — OFFICE VISIT (OUTPATIENT)
Dept: INTERNAL MEDICINE CLINIC | Facility: CLINIC | Age: 54
End: 2019-01-05
Payer: MEDICAID

## 2019-01-05 VITALS
DIASTOLIC BLOOD PRESSURE: 82 MMHG | HEIGHT: 63 IN | HEART RATE: 71 BPM | BODY MASS INDEX: 39.87 KG/M2 | SYSTOLIC BLOOD PRESSURE: 125 MMHG | WEIGHT: 225 LBS | OXYGEN SATURATION: 99 % | RESPIRATION RATE: 18 BRPM

## 2019-01-05 DIAGNOSIS — H52.4 PRESBYOPIA OF BOTH EYES: ICD-10-CM

## 2019-01-05 DIAGNOSIS — J30.89 NON-SEASONAL ALLERGIC RHINITIS, UNSPECIFIED TRIGGER: Primary | ICD-10-CM

## 2019-01-05 DIAGNOSIS — Z12.39 BREAST CANCER SCREENING: ICD-10-CM

## 2019-01-05 DIAGNOSIS — H10.13 ALLERGIC CONJUNCTIVITIS OF BOTH EYES: ICD-10-CM

## 2019-01-05 DIAGNOSIS — M25.571 CHRONIC PAIN OF RIGHT ANKLE: ICD-10-CM

## 2019-01-05 DIAGNOSIS — G89.29 CHRONIC PAIN OF RIGHT ANKLE: ICD-10-CM

## 2019-01-05 DIAGNOSIS — M54.31 SCIATICA, RIGHT SIDE: ICD-10-CM

## 2019-01-05 PROCEDURE — 99214 OFFICE O/P EST MOD 30 MIN: CPT | Performed by: FAMILY MEDICINE

## 2019-01-05 RX ORDER — KETOTIFEN FUMARATE 0.35 MG/ML
1 SOLUTION/ DROPS OPHTHALMIC 2 TIMES DAILY
Qty: 10 ML | Refills: 5 | Status: SHIPPED | OUTPATIENT
Start: 2019-01-05 | End: 2019-05-03

## 2019-01-05 RX ORDER — DICLOFENAC POTASSIUM 50 MG/1
50 TABLET, FILM COATED ORAL 2 TIMES DAILY PRN
Qty: 60 TABLET | Refills: 1 | Status: SHIPPED | OUTPATIENT
Start: 2019-01-05 | End: 2019-02-13

## 2019-01-05 RX ORDER — FLUTICASONE PROPIONATE 50 MCG
1 SPRAY, SUSPENSION (ML) NASAL 2 TIMES DAILY
Qty: 1 BOTTLE | Refills: 5 | Status: SHIPPED | OUTPATIENT
Start: 2019-01-05 | End: 2019-05-03

## 2019-01-05 NOTE — PROGRESS NOTES
CC:  Eye Pain (Pt presents to clinic for c/o b/l eye pain, throat discomfort x 1 month. )      Hx of CC:  IRRITATED/ITCHY EYES, NOSE, THROAT X 1 MONTH.  CHRONIC LOW BACK AND RIGHT LEG PAIN PERSISTING--SCHEDULED FOR STEROID EPIDURAL IN MID February.     ALSO STRAIN  GET READING GLASSES AS DISCUSSED    XR ANKLE (2 VIEW), RIGHT (CPT=65999)  FREDY SCREENING BILAT (CPT=08330)  No orders of the defined types were placed in this encounter.

## 2019-01-14 ENCOUNTER — OFFICE VISIT (OUTPATIENT)
Dept: INTERNAL MEDICINE CLINIC | Facility: CLINIC | Age: 54
End: 2019-01-14
Payer: MEDICAID

## 2019-01-14 VITALS
HEIGHT: 63 IN | DIASTOLIC BLOOD PRESSURE: 68 MMHG | WEIGHT: 225 LBS | SYSTOLIC BLOOD PRESSURE: 122 MMHG | RESPIRATION RATE: 17 BRPM | HEART RATE: 82 BPM | BODY MASS INDEX: 39.87 KG/M2 | OXYGEN SATURATION: 99 %

## 2019-01-14 DIAGNOSIS — M17.0 PRIMARY OSTEOARTHRITIS OF BOTH KNEES: Primary | ICD-10-CM

## 2019-01-14 DIAGNOSIS — G89.29 CHRONIC BILATERAL LOW BACK PAIN WITH RIGHT-SIDED SCIATICA: ICD-10-CM

## 2019-01-14 DIAGNOSIS — M17.11 PRIMARY OSTEOARTHRITIS OF RIGHT KNEE: ICD-10-CM

## 2019-01-14 DIAGNOSIS — M54.41 CHRONIC BILATERAL LOW BACK PAIN WITH RIGHT-SIDED SCIATICA: ICD-10-CM

## 2019-01-14 PROCEDURE — 20610 DRAIN/INJ JOINT/BURSA W/O US: CPT | Performed by: FAMILY MEDICINE

## 2019-01-14 PROCEDURE — 99212 OFFICE O/P EST SF 10 MIN: CPT | Performed by: FAMILY MEDICINE

## 2019-01-14 RX ORDER — TRIAMCINOLONE ACETONIDE 40 MG/ML
40 INJECTION, SUSPENSION INTRA-ARTICULAR; INTRAMUSCULAR ONCE
Status: COMPLETED | OUTPATIENT
Start: 2019-01-14 | End: 2019-01-17

## 2019-01-15 NOTE — PROGRESS NOTES
CC:  Knee Pain (Pt presents to clinic for right knee pain and steroid injection.)      Hx of CC:  CHRONIC BILATERAL KNEE PAINS WITH WEIGHT BEARING/WALKING WHICH IS WORSE ON RIGHT SIDE. FORMERLY RESPONDED TO KENALOG INJECTIONS AND PATIENT REQUESTING ONE.

## 2019-01-29 DIAGNOSIS — E11.42 CONTROLLED TYPE 2 DIABETES MELLITUS WITH DIABETIC POLYNEUROPATHY, WITHOUT LONG-TERM CURRENT USE OF INSULIN (HCC): ICD-10-CM

## 2019-01-31 RX ORDER — GLIPIZIDE 5 MG/1
TABLET ORAL
Qty: 60 TABLET | Refills: 0 | Status: SHIPPED | OUTPATIENT
Start: 2019-01-31 | End: 2019-02-13

## 2019-02-14 ENCOUNTER — HOSPITAL ENCOUNTER (OUTPATIENT)
Dept: GENERAL RADIOLOGY | Age: 54
Discharge: HOME | End: 2019-02-14
Attending: FAMILY MEDICINE
Payer: MEDICAID

## 2019-02-14 ENCOUNTER — HOSPITAL ENCOUNTER (OUTPATIENT)
Dept: MAMMOGRAPHY | Age: 54
Discharge: HOME | End: 2019-02-14
Attending: FAMILY MEDICINE
Payer: MEDICAID

## 2019-02-14 DIAGNOSIS — G89.29 CHRONIC PAIN OF RIGHT ANKLE: ICD-10-CM

## 2019-02-14 DIAGNOSIS — Z12.39 BREAST CANCER SCREENING: ICD-10-CM

## 2019-02-14 DIAGNOSIS — J45.21 MILD INTERMITTENT ASTHMA WITH ACUTE EXACERBATION: ICD-10-CM

## 2019-02-14 DIAGNOSIS — M25.571 CHRONIC PAIN OF RIGHT ANKLE: ICD-10-CM

## 2019-02-14 PROCEDURE — 77067 SCR MAMMO BI INCL CAD: CPT | Performed by: FAMILY MEDICINE

## 2019-02-14 PROCEDURE — 77063 BREAST TOMOSYNTHESIS BI: CPT | Performed by: FAMILY MEDICINE

## 2019-02-14 PROCEDURE — 73610 X-RAY EXAM OF ANKLE: CPT | Performed by: FAMILY MEDICINE

## 2019-02-14 RX ORDER — ALBUTEROL SULFATE 90 UG/1
AEROSOL, METERED RESPIRATORY (INHALATION)
Qty: 18 G | Refills: 2 | Status: SHIPPED | OUTPATIENT
Start: 2019-02-14 | End: 2019-05-03

## 2019-03-11 ENCOUNTER — PATIENT OUTREACH (OUTPATIENT)
Dept: INTERNAL MEDICINE CLINIC | Facility: CLINIC | Age: 54
End: 2019-03-11

## 2019-03-12 NOTE — TELEPHONE ENCOUNTER
Spoke to [facility] referral team. Linsey Short has  for epidural injection on 19. Spoke to , hippa verified. Advised  injection scheduled for 3/15/19 is cancelled and will need to be reordered and authorization obtained.   NOV is koko

## 2019-03-13 ENCOUNTER — TELEPHONE (OUTPATIENT)
Dept: NEUROLOGY | Facility: CLINIC | Age: 54
End: 2019-03-13

## 2019-03-13 NOTE — TELEPHONE ENCOUNTER
Kiley for [facility] BS Online for authorization of approval for right  L5-S1 TFESI cpt codes [id number], [id number]. Approval was given with  Authorization # [id number] for one unit/DOS effective 03/13/19 to 05/12/19. Will  inform Nursing.

## 2019-03-13 NOTE — TELEPHONE ENCOUNTER
Spoke to  and advised  office obtained  insurance authorization for injection.  unsure if patient has been holding aspirin for injection.    Patient called by Anisha Sepulvedaal, patient Kiswahili speaking, Has not been holding aspirin and injection schedu

## 2019-03-13 NOTE — TELEPHONE ENCOUNTER
Cyclobenzaprine 10mg. Take 1 tablet TID prn. #90. No refills.       Last filled-8/20/2018 with 1 refill      LOV-12/6/2018  NOV-3/14/2019

## 2019-03-13 NOTE — TELEPHONE ENCOUNTER
Order entered for L5 -S1 TFESI to obtain new  authorization for injection. Previous order written 18 has . Referral team Marlon Thakkar will obtain authorization. Patient to be contacted after authorization is obtained.

## 2019-03-13 NOTE — TELEPHONE ENCOUNTER
Spoke with  pt would like to have injections for this Friday 3-15-19. appt confirmed for 3-14-19 4:20pm with Dr Danita Yadav.

## 2019-03-14 ENCOUNTER — OFFICE VISIT (OUTPATIENT)
Dept: NEUROLOGY | Facility: CLINIC | Age: 54
End: 2019-03-14
Payer: MEDICAID

## 2019-03-14 VITALS — SYSTOLIC BLOOD PRESSURE: 108 MMHG | DIASTOLIC BLOOD PRESSURE: 62 MMHG | HEART RATE: 78 BPM | RESPIRATION RATE: 18 BRPM

## 2019-03-14 DIAGNOSIS — M43.16 SPONDYLOLISTHESIS OF LUMBAR REGION: ICD-10-CM

## 2019-03-14 DIAGNOSIS — G89.29 CHRONIC BILATERAL LOW BACK PAIN WITH RIGHT-SIDED SCIATICA: ICD-10-CM

## 2019-03-14 DIAGNOSIS — M43.10 RETROLISTHESIS: ICD-10-CM

## 2019-03-14 DIAGNOSIS — M54.31 SCIATICA, RIGHT SIDE: ICD-10-CM

## 2019-03-14 DIAGNOSIS — M48.062 SPINAL STENOSIS OF LUMBAR REGION WITH NEUROGENIC CLAUDICATION: ICD-10-CM

## 2019-03-14 DIAGNOSIS — M54.41 CHRONIC BILATERAL LOW BACK PAIN WITH RIGHT-SIDED SCIATICA: ICD-10-CM

## 2019-03-14 DIAGNOSIS — M51.9 LUMBAR DISC DISEASE: ICD-10-CM

## 2019-03-14 DIAGNOSIS — M48.061 LUMBAR FORAMINAL STENOSIS: ICD-10-CM

## 2019-03-14 DIAGNOSIS — M51.16 LUMBAR DISC DISEASE WITH RADICULOPATHY: ICD-10-CM

## 2019-03-14 DIAGNOSIS — M54.16 LUMBAR RADICULOPATHY: Primary | ICD-10-CM

## 2019-03-14 PROCEDURE — 99214 OFFICE O/P EST MOD 30 MIN: CPT | Performed by: PHYSICAL MEDICINE & REHABILITATION

## 2019-03-14 RX ORDER — ACETAMINOPHEN AND CODEINE PHOSPHATE 300; 30 MG/1; MG/1
1 TABLET ORAL EVERY 6 HOURS PRN
Qty: 30 TABLET | Refills: 2 | Status: SHIPPED | OUTPATIENT
Start: 2019-03-14 | End: 2019-04-24

## 2019-03-14 NOTE — PATIENT INSTRUCTIONS
Plan  I will perform right L5 TFESI(s) under MAC. She will continue with the Tylenol #3 and the Neurontin for the pain.     The patient will follow up in 3 months, but the patient will call me 2 weeks after having the injection to let me know how the i

## 2019-03-14 NOTE — PROGRESS NOTES
Low Back Pain H & P    Chief Complaint: Patient presents with:  Low Back Pain: LOV 12/06/18 patient was suppose to get her injection after last visit however, she states they keep canceling her appointment she is in alot of pain 10 being the worst and it r History   Social History    Socioeconomic History      Marital status:       Spouse name: Not on file      Number of children: 5      Years of education: Not on file      Highest education level: Not on file    Occupational History      Not on file stairs. Review of Systems      PE: The patient does appear in her stated age in moderate distress. The patient is well groomed. Psychiatric:  The patient is alert and oriented x 3. The patient has a normal affect and mood.       Respiratory:  No her home exercise program.    I told her that she does not need to hold the Metformin and the 81 mg aspirin before she has the injection. The patient understands and agrees with the stated plan. Damien Farley MD  3/14/2019

## 2019-03-14 NOTE — PROGRESS NOTES
Patient has been scheduled for a right L5 TFESI  on 3/15/2019 at the [facility] by Keiry Floyd at [address] [place] [address] for 12:30p. Medications and allergies reviewed.  Patient informed to hold aspirins, nsaids, blood thinners, multivitamins, vitamin E and fish oils 3-7 days prio

## 2019-03-15 ENCOUNTER — OFFICE VISIT (OUTPATIENT)
Dept: FAMILY MEDICINE CLINIC | Facility: CLINIC | Age: 54
End: 2019-03-15
Payer: MEDICAID

## 2019-03-15 VITALS
OXYGEN SATURATION: 98 % | SYSTOLIC BLOOD PRESSURE: 115 MMHG | DIASTOLIC BLOOD PRESSURE: 67 MMHG | TEMPERATURE: 98 F | HEART RATE: 75 BPM

## 2019-03-15 DIAGNOSIS — R39.9 URINARY SYMPTOM OR SIGN: Primary | ICD-10-CM

## 2019-03-15 DIAGNOSIS — R30.0 DYSURIA: ICD-10-CM

## 2019-03-15 LAB
BILIRUBIN: NEGATIVE
GLUCOSE (URINE DIPSTICK): NEGATIVE MG/DL
KETONES (URINE DIPSTICK): NEGATIVE MG/DL
MULTISTIX LOT#: ABNORMAL NUMERIC
NITRITE, URINE: NEGATIVE
PH, URINE: 8.5 (ref 4.5–8)
SPECIFIC GRAVITY: 1.02 (ref 1–1.03)
URINE-COLOR: YELLOW
UROBILINOGEN,SEMI-QN: 0.2 MG/DL (ref 0–1.9)

## 2019-03-15 PROCEDURE — 81002 URINALYSIS NONAUTO W/O SCOPE: CPT | Performed by: NURSE PRACTITIONER

## 2019-03-15 PROCEDURE — 87086 URINE CULTURE/COLONY COUNT: CPT | Performed by: NURSE PRACTITIONER

## 2019-03-15 PROCEDURE — 99213 OFFICE O/P EST LOW 20 MIN: CPT | Performed by: NURSE PRACTITIONER

## 2019-03-15 PROCEDURE — 87186 SC STD MICRODIL/AGAR DIL: CPT | Performed by: NURSE PRACTITIONER

## 2019-03-15 PROCEDURE — 87088 URINE BACTERIA CULTURE: CPT | Performed by: NURSE PRACTITIONER

## 2019-03-15 RX ORDER — NITROFURANTOIN 25; 75 MG/1; MG/1
100 CAPSULE ORAL 2 TIMES DAILY
Qty: 14 CAPSULE | Refills: 0 | Status: SHIPPED | OUTPATIENT
Start: 2019-03-15 | End: 2019-03-22

## 2019-03-15 RX ORDER — CYCLOBENZAPRINE HCL 10 MG
TABLET ORAL
Qty: 90 TABLET | Refills: 0 | OUTPATIENT
Start: 2019-03-15

## 2019-03-15 NOTE — PATIENT INSTRUCTIONS
Infección De La Vejiga,Josi [Bladder Infection: Female, Adult]    Lilian infección de la vejiga (“cistitis” [cystitis - UTI]) suele provocar constantes deseos de orinar y ardor al orinar.  Es posible que la [facility] se nael turbia u [place], o que [address] · Si está tomando píldoras anticonceptivas y tiene frecuentes infecciones de vejiga, háblelo con plummer médico.  Visita De Control  Visite a plummer médico si no ariza desparecido todos los síntomas después de dionna días de [place].   Busque Prontamente Atención Mé

## 2019-03-15 NOTE — PROGRESS NOTES
CHIEF COMPLAINT:   Patient presents with:  UTI      HPI:   Arie Tsai is a 52year old female who presents with symptoms of UTI. Complaining of urinary frequency, urgency, dysuria for last 5 days.  Symptoms has been persistent but not worsened sinc Cyclobenzaprine HCl 10 MG Oral Tab Take 1 tablet (10 mg total) by mouth 3 (three) times daily as needed.  Disp: 90 tablet Rfl: 1      Past Medical History:   Diagnosis Date   • CTS (carpal tunnel syndrome)     alesha   • Hard of hearing    • Hearing impairment Urinary symptom or sign  (primary encounter diagnosis)  Dysuria    PLAN: Meds as listed below. Comfort measures as described in Patient Instructions.  Will send for urine culture and augment plan as needed    Meds & Refills for this Visit:  Requested Presc · Lilian infección de la vejiga (bladder infection) se trata con antibióticos (antibiotics). También es posible que le receten Pyridum (nombre genérico: fenazopiridina [phenazopyridine]) para aliviar el ardor.  Petra medicamento hará que plummer orina sea de color na The patient indicates understanding of these issues and agrees to the plan. The patient is asked to follow up in 3 days if not better. Please follow up sooner for worsening symptoms.

## 2019-03-29 NOTE — TELEPHONE ENCOUNTER
S/w pt and dtg, Imani Nolen, as pt is [place] speaking to schedule the follow: Patient has been scheduled for a Right L5 TFESI under [facility] on 5/10/19 at the [address] [place] [address]. Medications and allergies reviewed.  Patient informed to hold aspirins, nsaids, blood thinners, vitamin

## 2019-04-23 DIAGNOSIS — M51.16 LUMBAR DISC DISEASE WITH RADICULOPATHY: ICD-10-CM

## 2019-04-23 DIAGNOSIS — M54.31 SCIATICA, RIGHT SIDE: ICD-10-CM

## 2019-04-23 DIAGNOSIS — E11.42 CONTROLLED TYPE 2 DIABETES MELLITUS WITH DIABETIC POLYNEUROPATHY, WITHOUT LONG-TERM CURRENT USE OF INSULIN (HCC): ICD-10-CM

## 2019-04-24 ENCOUNTER — TELEPHONE (OUTPATIENT)
Dept: INTERNAL MEDICINE CLINIC | Facility: CLINIC | Age: 54
End: 2019-04-24

## 2019-04-24 RX ORDER — ACETAMINOPHEN AND CODEINE PHOSPHATE 300; 30 MG/1; MG/1
1 TABLET ORAL EVERY 6 HOURS PRN
Qty: 90 TABLET | Refills: 0 | Status: SHIPPED | OUTPATIENT
Start: 2019-04-24 | End: 2019-05-18

## 2019-04-24 RX ORDER — GLIPIZIDE 5 MG/1
TABLET ORAL
Qty: 60 TABLET | Refills: 0 | Status: SHIPPED | OUTPATIENT
Start: 2019-04-24 | End: 2019-05-18

## 2019-04-24 NOTE — TELEPHONE ENCOUNTER
I called patient to inform her that she needs to come in and see Dr. Maudine Closs for blood work and diabetic  f/p appt. She did not answer but I called the house phone number and  was inform.

## 2019-05-03 NOTE — TELEPHONE ENCOUNTER
Left message on home #. Pt told that Dr Ernesto Wing needed to cancel procedures 05/10/19. We are calling to reschedule, asked to call back to reschedule either 05/15/19 or 05/22/19.

## 2019-05-07 ENCOUNTER — TELEPHONE (OUTPATIENT)
Dept: NEUROLOGY | Facility: CLINIC | Age: 54
End: 2019-05-07

## 2019-05-07 NOTE — TELEPHONE ENCOUNTER
Spoke to ILYA  for [employer]. Pt rescheduled with Mariaelena for 05/15/19 Wednesday at [facility]. Instructions reviewed. [facility] faxed reschuled.

## 2019-05-10 NOTE — TELEPHONE ENCOUNTER
Patients daughter calling upset regarding the re-scheduling of the injection at [address] [place] [address]. Daughter informed Dr. Timur Leone cancelled clinic at [address] [place] [address] due to personal reasons and she was re-scheduled to 5/15/19 by Daniel SOLARES and message was left to call back.     Routing to

## 2019-05-11 ENCOUNTER — OFFICE VISIT (OUTPATIENT)
Dept: FAMILY MEDICINE CLINIC | Facility: CLINIC | Age: 54
End: 2019-05-11
Payer: MEDICAID

## 2019-05-11 VITALS
RESPIRATION RATE: 14 BRPM | DIASTOLIC BLOOD PRESSURE: 61 MMHG | WEIGHT: 220 LBS | OXYGEN SATURATION: 98 % | TEMPERATURE: 98 F | HEIGHT: 63 IN | SYSTOLIC BLOOD PRESSURE: 135 MMHG | BODY MASS INDEX: 38.98 KG/M2 | HEART RATE: 71 BPM

## 2019-05-11 DIAGNOSIS — R09.82 POST-NASAL DRIP: ICD-10-CM

## 2019-05-11 DIAGNOSIS — R05.9 COUGH: Primary | ICD-10-CM

## 2019-05-11 PROCEDURE — 99213 OFFICE O/P EST LOW 20 MIN: CPT | Performed by: NURSE PRACTITIONER

## 2019-05-11 RX ORDER — BENZONATATE 100 MG/1
100 CAPSULE ORAL 3 TIMES DAILY PRN
Qty: 30 CAPSULE | Refills: 0 | Status: SHIPPED | OUTPATIENT
Start: 2019-05-11 | End: 2019-05-21

## 2019-05-11 NOTE — PATIENT INSTRUCTIONS
Tessalon for cough  Loratadine (claritin) for post nasal drainage    Allergic Rhinitis  Allergic rhinitis is an allergic reaction that affects the nose, and often the eyes. It’s often known as nasal allergies.  Nasal allergies are often due to things in t · Keep humidity low by using a dehumidifier or air conditioner. Keep the dehumidifier and air conditioner clean and free of mold. · Clean moldy areas with bleach and water. In general:  · Vacuum once or twice a week.  If possible, use a vacuum with a high Los síntomas son, por ejemplo, goteo nasal, congestión y comezón en la nariz. También incluyen estornudos, ojos rojos y con comezón, y areolas oscuras (\"ojeras alérgicas\") debajo de los ojos. Además, usted puede estar irritable y cansado.  Las Vansant fu · Mantenga bajo el nivel de humedad usando un deshumidificador o el aparato de aire acondicionado. Felicitas Lighter deshumidificador y el aire acondicionado limpios y sin moho. · Limpie áreas que tengan moho con agua y blanqueador.   En general:  · Pase la aspi

## 2019-05-11 NOTE — PROGRESS NOTES
CHIEF COMPLAINT:   Patient presents with:  Sore Throat      HPI:   Joseph Moura is a 62year old female who presents for upper respiratory symptoms. Patient here for sore throat and dry cough for 20 days. Throat with some discomfort with swallowing.  Shanika Nunes • HAND/FINGER SURGERY UNLISTED Left    • HERNIA SURGERY           Social History    Tobacco Use      Smoking status: Never Smoker      Smokeless tobacco: Never Used    Alcohol use: No      Alcohol/week: 0.0 oz    Drug use: No        REVIEW OF SYSTEMS:   GE Patient Instructions     Tessalon for cough  Loratadine (claritin) for post nasal drainage    Allergic Rhinitis  Allergic rhinitis is an allergic reaction that affects the nose, and often the eyes. It’s often known as nasal allergies.  Nasal allergies are o · Keep humidity low by using a dehumidifier or air conditioner. Keep the dehumidifier and air conditioner clean and free of mold. · Clean moldy areas with bleach and water. In general:  · Vacuum once or twice a week.  If possible, use a vacuum with a high Los síntomas son, por ejemplo, goteo nasal, congestión y comezón en la nariz. También incluyen estornudos, ojos rojos y con comezón, y areolas oscuras (\"ojeras alérgicas\") debajo de los ojos. Además, usted puede estar irritable y cansado.  Las Edwards fu · Mantenga bajo el nivel de humedad usando un deshumidificador o el aparato de aire acondicionado. Deborah Echevaria deshumidificador y el aire acondicionado limpios y sin moho. · Limpie áreas que tengan moho con agua y blanqueador.   En general:  · Pase la aspi

## 2019-05-13 ENCOUNTER — TELEPHONE (OUTPATIENT)
Dept: NEUROLOGY | Facility: CLINIC | Age: 54
End: 2019-05-13

## 2019-05-13 NOTE — TELEPHONE ENCOUNTER
See phone encounter 5/7/19. Patient is already scheduled for injection 5/15/19. Per [facility] referral team obtained authorization for date 5/15/19. Expiration date was listed as 5/12/19 but obtained extension.

## 2019-05-13 NOTE — TELEPHONE ENCOUNTER
Called Nacho for [facility] BS to request extension for authorization of approval of right L5 TFESI with Authorization # [id number] for one unit/DOS effective 03/13/19 to 05/12/19.  Talked to Shelley LEWIS   Nurse reviewer who  initiated new request. Approved with Aut

## 2019-05-15 ENCOUNTER — ANESTHESIA (OUTPATIENT)
Dept: SURGERY | Facility: HOSPITAL | Age: 54
End: 2019-05-15
Payer: MEDICAID

## 2019-05-15 ENCOUNTER — ANESTHESIA EVENT (OUTPATIENT)
Dept: SURGERY | Facility: HOSPITAL | Age: 54
End: 2019-05-15
Payer: MEDICAID

## 2019-05-15 ENCOUNTER — HOSPITAL ENCOUNTER (OUTPATIENT)
Facility: HOSPITAL | Age: 54
Setting detail: HOSPITAL OUTPATIENT SURGERY
Discharge: HOME | End: 2019-05-15
Attending: PHYSICAL MEDICINE & REHABILITATION | Admitting: PHYSICAL MEDICINE & REHABILITATION
Payer: MEDICAID

## 2019-05-15 ENCOUNTER — APPOINTMENT (OUTPATIENT)
Dept: GENERAL RADIOLOGY | Facility: HOSPITAL | Age: 54
End: 2019-05-15
Attending: PHYSICAL MEDICINE & REHABILITATION
Payer: MEDICAID

## 2019-05-15 VITALS
DIASTOLIC BLOOD PRESSURE: 59 MMHG | HEIGHT: 63 IN | OXYGEN SATURATION: 98 % | RESPIRATION RATE: 16 BRPM | HEART RATE: 66 BPM | WEIGHT: 221 LBS | BODY MASS INDEX: 39.16 KG/M2 | TEMPERATURE: 98 F | SYSTOLIC BLOOD PRESSURE: 124 MMHG

## 2019-05-15 PROCEDURE — 64483 NJX AA&/STRD TFRM EPI L/S 1: CPT | Performed by: PHYSICAL MEDICINE & REHABILITATION

## 2019-05-15 PROCEDURE — 3E0R33Z INTRODUCTION OF ANTI-INFLAMMATORY INTO SPINAL CANAL, PERCUTANEOUS APPROACH: ICD-10-PCS | Performed by: PHYSICAL MEDICINE & REHABILITATION

## 2019-05-15 PROCEDURE — 3E0R3BZ INTRODUCTION OF ANESTHETIC AGENT INTO SPINAL CANAL, PERCUTANEOUS APPROACH: ICD-10-PCS | Performed by: PHYSICAL MEDICINE & REHABILITATION

## 2019-05-15 RX ORDER — NALOXONE HYDROCHLORIDE 0.4 MG/ML
80 INJECTION, SOLUTION INTRAMUSCULAR; INTRAVENOUS; SUBCUTANEOUS AS NEEDED
Status: DISCONTINUED | OUTPATIENT
Start: 2019-05-15 | End: 2019-05-15

## 2019-05-15 RX ORDER — PROCHLORPERAZINE EDISYLATE 5 MG/ML
5 INJECTION INTRAMUSCULAR; INTRAVENOUS ONCE AS NEEDED
Status: DISCONTINUED | OUTPATIENT
Start: 2019-05-15 | End: 2019-05-15

## 2019-05-15 RX ORDER — HYDROCODONE BITARTRATE AND ACETAMINOPHEN 5; 325 MG/1; MG/1
1 TABLET ORAL AS NEEDED
Status: DISCONTINUED | OUTPATIENT
Start: 2019-05-15 | End: 2019-05-15

## 2019-05-15 RX ORDER — MORPHINE SULFATE 4 MG/ML
4 INJECTION, SOLUTION INTRAMUSCULAR; INTRAVENOUS EVERY 10 MIN PRN
Status: DISCONTINUED | OUTPATIENT
Start: 2019-05-15 | End: 2019-05-15

## 2019-05-15 RX ORDER — ONDANSETRON 2 MG/ML
4 INJECTION INTRAMUSCULAR; INTRAVENOUS ONCE AS NEEDED
Status: DISCONTINUED | OUTPATIENT
Start: 2019-05-15 | End: 2019-05-15

## 2019-05-15 RX ORDER — LIDOCAINE HYDROCHLORIDE 10 MG/ML
INJECTION, SOLUTION EPIDURAL; INFILTRATION; INTRACAUDAL; PERINEURAL AS NEEDED
Status: DISCONTINUED | OUTPATIENT
Start: 2019-05-15 | End: 2019-05-15 | Stop reason: HOSPADM

## 2019-05-15 RX ORDER — FAMOTIDINE 20 MG/1
20 TABLET ORAL ONCE
Status: COMPLETED | OUTPATIENT
Start: 2019-05-15 | End: 2019-05-15

## 2019-05-15 RX ORDER — MORPHINE SULFATE 2 MG/ML
2 INJECTION, SOLUTION INTRAMUSCULAR; INTRAVENOUS EVERY 10 MIN PRN
Status: DISCONTINUED | OUTPATIENT
Start: 2019-05-15 | End: 2019-05-15

## 2019-05-15 RX ORDER — SODIUM CHLORIDE, SODIUM LACTATE, POTASSIUM CHLORIDE, CALCIUM CHLORIDE 600; 310; 30; 20 MG/100ML; MG/100ML; MG/100ML; MG/100ML
INJECTION, SOLUTION INTRAVENOUS CONTINUOUS
Status: DISCONTINUED | OUTPATIENT
Start: 2019-05-15 | End: 2019-05-15

## 2019-05-15 RX ORDER — TRIAMCINOLONE ACETONIDE 40 MG/ML
INJECTION, SUSPENSION INTRA-ARTICULAR; INTRAMUSCULAR AS NEEDED
Status: DISCONTINUED | OUTPATIENT
Start: 2019-05-15 | End: 2019-05-15 | Stop reason: HOSPADM

## 2019-05-15 RX ORDER — HYDROMORPHONE HYDROCHLORIDE 1 MG/ML
0.2 INJECTION, SOLUTION INTRAMUSCULAR; INTRAVENOUS; SUBCUTANEOUS EVERY 5 MIN PRN
Status: DISCONTINUED | OUTPATIENT
Start: 2019-05-15 | End: 2019-05-15

## 2019-05-15 RX ORDER — HYDROMORPHONE HYDROCHLORIDE 1 MG/ML
0.6 INJECTION, SOLUTION INTRAMUSCULAR; INTRAVENOUS; SUBCUTANEOUS EVERY 5 MIN PRN
Status: DISCONTINUED | OUTPATIENT
Start: 2019-05-15 | End: 2019-05-15

## 2019-05-15 RX ORDER — ACETAMINOPHEN 500 MG
1000 TABLET ORAL ONCE
Status: COMPLETED | OUTPATIENT
Start: 2019-05-15 | End: 2019-05-15

## 2019-05-15 RX ORDER — MIDAZOLAM HYDROCHLORIDE 1 MG/ML
INJECTION INTRAMUSCULAR; INTRAVENOUS AS NEEDED
Status: DISCONTINUED | OUTPATIENT
Start: 2019-05-15 | End: 2019-05-15 | Stop reason: SURG

## 2019-05-15 RX ORDER — METOCLOPRAMIDE 10 MG/1
10 TABLET ORAL ONCE
Status: COMPLETED | OUTPATIENT
Start: 2019-05-15 | End: 2019-05-15

## 2019-05-15 RX ORDER — MORPHINE SULFATE 10 MG/ML
6 INJECTION, SOLUTION INTRAMUSCULAR; INTRAVENOUS EVERY 10 MIN PRN
Status: DISCONTINUED | OUTPATIENT
Start: 2019-05-15 | End: 2019-05-15

## 2019-05-15 RX ORDER — HYDROMORPHONE HYDROCHLORIDE 1 MG/ML
0.4 INJECTION, SOLUTION INTRAMUSCULAR; INTRAVENOUS; SUBCUTANEOUS EVERY 5 MIN PRN
Status: DISCONTINUED | OUTPATIENT
Start: 2019-05-15 | End: 2019-05-15

## 2019-05-15 RX ORDER — LIDOCAINE HYDROCHLORIDE 10 MG/ML
INJECTION, SOLUTION EPIDURAL; INFILTRATION; INTRACAUDAL; PERINEURAL AS NEEDED
Status: DISCONTINUED | OUTPATIENT
Start: 2019-05-15 | End: 2019-05-15 | Stop reason: SURG

## 2019-05-15 RX ORDER — HYDROCODONE BITARTRATE AND ACETAMINOPHEN 5; 325 MG/1; MG/1
2 TABLET ORAL AS NEEDED
Status: DISCONTINUED | OUTPATIENT
Start: 2019-05-15 | End: 2019-05-15

## 2019-05-15 RX ORDER — DEXTROSE MONOHYDRATE 25 G/50ML
50 INJECTION, SOLUTION INTRAVENOUS
Status: DISCONTINUED | OUTPATIENT
Start: 2019-05-15 | End: 2019-05-15

## 2019-05-15 RX ADMIN — MIDAZOLAM HYDROCHLORIDE 2 MG: 1 INJECTION INTRAMUSCULAR; INTRAVENOUS at 16:05:00

## 2019-05-15 RX ADMIN — SODIUM CHLORIDE, SODIUM LACTATE, POTASSIUM CHLORIDE, CALCIUM CHLORIDE: 600; 310; 30; 20 INJECTION, SOLUTION INTRAVENOUS at 16:00:00

## 2019-05-15 RX ADMIN — LIDOCAINE HYDROCHLORIDE 25 MG: 10 INJECTION, SOLUTION EPIDURAL; INFILTRATION; INTRACAUDAL; PERINEURAL at 16:05:00

## 2019-05-15 NOTE — ANESTHESIA POSTPROCEDURE EVALUATION
Patient: Cristina Saravia    Procedure Summary     Date:  05/15/19 Room / Location:  [address] [place] [address] MAIN OR [address] [place] [address] OR    Anesthesia Start:  1600 Anesthesia Stop:      Procedure:  TRANSFORAMINAL LUMBAR EPIDURAL STEROID INJECTION SINGLE LEVEL (Right Spine Lumba

## 2019-05-15 NOTE — ANESTHESIA PREPROCEDURE EVALUATION
Anesthesia PreOp Note    HPI:     Stone Hawley is a 62year old female who presents for preoperative consultation requested by: Jeffery Garcia MD    Date of Surgery: 5/15/2019    Procedure(s):  TRANSFORAMINAL LUMBAR EPIDURAL STEROID INJECTION SINGLE Controlled type 2 diabetes mellitus with diabetic polyneuropathy, without long-term current use of insulin ([facility] [address]         Date Noted: 04/09/2015      Mixed hyperlipidemia         Date Noted: 04/09/2015      Diabetic neuropathy [facility])         Date Noted: 04/0 More than a month at Unknown time   LISINOPRIL 2.5 MG Oral Tab TAKE 1 TABLET(2.5 MG) BY MOUTH DAILY (Patient not taking: Reported on 5/3/2019) Disp: 90 tablet Rfl: 5 More than a month at Unknown time       Current Facility-Administered Medications Ordered Concerns:        Caffeine Concern: No        Exercise: No        Seat Belt: Not Asked        Special Diet: Not Asked        Stress Concern: Not Asked        Weight Concern: Not Asked         Service: Not Asked        Blood Transfusions: Not Asked alternative forms of anesthetic management. All of the patient's questions were answered to the best of my ability. The patient desires the anesthetic management as planned.   Author Lassiter  5/15/2019 3:40 PM

## 2019-05-15 NOTE — H&P
[address] Patient Status:  Hospital Outpatient Surgery    1965 MRN [id number]   Location [facility] PRE OP RECOVERY Attending Amadou Duggan MD   Hosp Day # 0 PCP Adrienne Rebollar DO at Unknown time   atorvastatin 40 MG Oral Tab Take 1 tablet (40 mg total) by mouth daily.  Disp: 30 tablet Rfl: 5 More than a month at Unknown time   LISINOPRIL 2.5 MG Oral Tab TAKE 1 TABLET(2.5 MG) BY MOUTH DAILY (Patient not taking: Reported on 5/3/2019) of right ankle     Allergic conjunctivitis of both eyes     Non-seasonal allergic rhinitis     Primary osteoarthritis of right knee      I will do a right L5 TFESI. Rvika Avila  5/15/2019  3:17 PM

## 2019-05-15 NOTE — DISCHARGE SUMMARY
[facility]    Discharge Summary    Sophronia Agent Patient Status:  Hospital Outpatient Surgery    1965 MRN [id number]   Location [facility] PRE OP RECOVERY Attending Yaima Cowan MD   Hosp Day # 0 PCP Lizzie Mcnulty DO Pain.   Quantity:  90 tablet  Refills:  0     atorvastatin 40 MG Tabs  Commonly known as:  LIPITOR      Take 1 tablet (40 mg total) by mouth daily.    Quantity:  30 tablet  Refills:  5     benzonatate 100 MG Caps  Commonly known as:  TESSALON      Take 1 ca

## 2019-05-15 NOTE — OPERATIVE REPORT
[facility] Outpatient Surgery    LUMBAR TRANSFORAMINAL   NAME:  Stone Hawley    MR #:    [id number] :  1965     PHYSICIAN:  Lucian Avila        Operative Report    DATE OF PROCEDURE: 5/15/2019   PREOPERATIVE DIAGNOSES: Problem   L3-4 transferred to the cart and into PAR. The patient was given discharge instructions and will follow up in the clinic as scheduled.   Throughout the whole procedure, the patient's pulse oximetry and vital signs were monitored and they remained completely sta

## 2019-05-18 ENCOUNTER — OFFICE VISIT (OUTPATIENT)
Dept: INTERNAL MEDICINE CLINIC | Facility: CLINIC | Age: 54
End: 2019-05-18
Payer: MEDICAID

## 2019-05-18 VITALS
HEART RATE: 82 BPM | HEIGHT: 63 IN | BODY MASS INDEX: 39.16 KG/M2 | OXYGEN SATURATION: 98 % | WEIGHT: 221 LBS | SYSTOLIC BLOOD PRESSURE: 120 MMHG | DIASTOLIC BLOOD PRESSURE: 68 MMHG

## 2019-05-18 DIAGNOSIS — J30.89 NON-SEASONAL ALLERGIC RHINITIS, UNSPECIFIED TRIGGER: ICD-10-CM

## 2019-05-18 DIAGNOSIS — M51.16 LUMBAR DISC DISEASE WITH RADICULOPATHY: ICD-10-CM

## 2019-05-18 DIAGNOSIS — M17.11 PRIMARY OSTEOARTHRITIS OF RIGHT KNEE: ICD-10-CM

## 2019-05-18 DIAGNOSIS — E11.42 DIABETIC POLYNEUROPATHY ASSOCIATED WITH TYPE 2 DIABETES MELLITUS (HCC): ICD-10-CM

## 2019-05-18 DIAGNOSIS — M54.31 SCIATICA, RIGHT SIDE: ICD-10-CM

## 2019-05-18 DIAGNOSIS — E78.2 MIXED HYPERLIPIDEMIA: ICD-10-CM

## 2019-05-18 DIAGNOSIS — E11.42 CONTROLLED TYPE 2 DIABETES MELLITUS WITH DIABETIC POLYNEUROPATHY, WITHOUT LONG-TERM CURRENT USE OF INSULIN (HCC): Primary | ICD-10-CM

## 2019-05-18 DIAGNOSIS — Z98.890 HISTORY OF ANKLE SURGERY: ICD-10-CM

## 2019-05-18 DIAGNOSIS — J45.21 MILD INTERMITTENT ASTHMA WITH ACUTE EXACERBATION: ICD-10-CM

## 2019-05-18 PROCEDURE — 80053 COMPREHEN METABOLIC PANEL: CPT | Performed by: FAMILY MEDICINE

## 2019-05-18 PROCEDURE — 80061 LIPID PANEL: CPT | Performed by: FAMILY MEDICINE

## 2019-05-18 PROCEDURE — 83036 HEMOGLOBIN GLYCOSYLATED A1C: CPT | Performed by: FAMILY MEDICINE

## 2019-05-18 PROCEDURE — 99214 OFFICE O/P EST MOD 30 MIN: CPT | Performed by: FAMILY MEDICINE

## 2019-05-18 RX ORDER — LOSARTAN POTASSIUM 25 MG/1
25 TABLET ORAL DAILY
Qty: 30 TABLET | Refills: 11 | Status: SHIPPED | OUTPATIENT
Start: 2019-05-18 | End: 2019-08-17

## 2019-05-18 RX ORDER — GLIPIZIDE 5 MG/1
TABLET ORAL
Qty: 60 TABLET | Refills: 11 | Status: SHIPPED | OUTPATIENT
Start: 2019-05-18

## 2019-05-18 RX ORDER — ACETAMINOPHEN AND CODEINE PHOSPHATE 300; 30 MG/1; MG/1
1 TABLET ORAL EVERY 6 HOURS PRN
Qty: 90 TABLET | Refills: 0 | Status: SHIPPED | OUTPATIENT
Start: 2019-05-18 | End: 2019-07-08

## 2019-05-18 RX ORDER — ATORVASTATIN CALCIUM 40 MG/1
40 TABLET, FILM COATED ORAL DAILY
Qty: 30 TABLET | Refills: 11 | Status: SHIPPED | OUTPATIENT
Start: 2019-05-18 | End: 2020-06-10

## 2019-05-18 RX ORDER — GABAPENTIN 300 MG/1
300 CAPSULE ORAL 3 TIMES DAILY
Qty: 90 CAPSULE | Refills: 5 | Status: SHIPPED | OUTPATIENT
Start: 2019-05-18 | End: 2019-12-02

## 2019-05-18 RX ORDER — MONTELUKAST SODIUM 10 MG/1
10 TABLET ORAL NIGHTLY
Qty: 30 TABLET | Refills: 5 | Status: SHIPPED | OUTPATIENT
Start: 2019-05-18 | End: 2019-09-17

## 2019-05-18 NOTE — PROGRESS NOTES
CC:  No chief complaint on file. Hx of CC:  FASTING FOR RECHECK OF DM/LIPIDS. DOES NOT CHECK GLUCOSE READINGS AT HOME. C/O CHRONIC RIGHT ANKLE PAIN POST FORMER FRACTURE/ORIF. IMPROVED LOW BACK PAIN/SCIATICA POST STEROID INJECTION LAST WEEK.     Nellie mg total) by mouth daily. Dispense: 30 tablet; Refill: 11    3. Mixed hyperlipidemia:  CONTROLLED ON STATIN    - COMP METABOLIC PANEL (14)  - LIPID PANEL  - atorvastatin 40 MG Oral Tab; Take 1 tablet (40 mg total) by mouth daily. Dispense: 30 tablet;  Ref

## 2019-05-21 DIAGNOSIS — E11.42 CONTROLLED TYPE 2 DIABETES MELLITUS WITH DIABETIC POLYNEUROPATHY, WITHOUT LONG-TERM CURRENT USE OF INSULIN (HCC): ICD-10-CM

## 2019-05-21 RX ORDER — GLIPIZIDE 5 MG/1
TABLET ORAL
Qty: 60 TABLET | Refills: 0 | Status: SHIPPED | OUTPATIENT
Start: 2019-05-21 | End: 2019-06-17

## 2019-06-17 DIAGNOSIS — E11.42 CONTROLLED TYPE 2 DIABETES MELLITUS WITH DIABETIC POLYNEUROPATHY, WITHOUT LONG-TERM CURRENT USE OF INSULIN (HCC): ICD-10-CM

## 2019-06-17 RX ORDER — GLIPIZIDE 5 MG/1
TABLET ORAL
Qty: 60 TABLET | Refills: 0 | Status: SHIPPED | OUTPATIENT
Start: 2019-06-17 | End: 2019-11-12

## 2019-07-08 ENCOUNTER — OFFICE VISIT (OUTPATIENT)
Dept: INTERNAL MEDICINE CLINIC | Facility: CLINIC | Age: 54
End: 2019-07-08
Payer: MEDICAID

## 2019-07-08 VITALS
HEART RATE: 71 BPM | SYSTOLIC BLOOD PRESSURE: 128 MMHG | WEIGHT: 221 LBS | DIASTOLIC BLOOD PRESSURE: 78 MMHG | BODY MASS INDEX: 39.16 KG/M2 | HEIGHT: 63 IN | OXYGEN SATURATION: 98 %

## 2019-07-08 DIAGNOSIS — M54.42 CHRONIC LEFT-SIDED LOW BACK PAIN WITH LEFT-SIDED SCIATICA: ICD-10-CM

## 2019-07-08 DIAGNOSIS — M48.061 LUMBAR FORAMINAL STENOSIS: ICD-10-CM

## 2019-07-08 DIAGNOSIS — M48.062 SPINAL STENOSIS OF LUMBAR REGION WITH NEUROGENIC CLAUDICATION: ICD-10-CM

## 2019-07-08 DIAGNOSIS — L50.9 URTICARIA: Primary | ICD-10-CM

## 2019-07-08 DIAGNOSIS — G89.29 CHRONIC LEFT-SIDED LOW BACK PAIN WITH LEFT-SIDED SCIATICA: ICD-10-CM

## 2019-07-08 DIAGNOSIS — M51.9 LUMBAR DISC DISEASE: ICD-10-CM

## 2019-07-08 DIAGNOSIS — M51.16 LUMBAR DISC DISEASE WITH RADICULOPATHY: ICD-10-CM

## 2019-07-08 PROCEDURE — 99213 OFFICE O/P EST LOW 20 MIN: CPT | Performed by: FAMILY MEDICINE

## 2019-07-08 RX ORDER — HYDROXYZINE HYDROCHLORIDE 25 MG/1
25 TABLET, FILM COATED ORAL EVERY 6 HOURS PRN
Qty: 90 TABLET | Refills: 0 | Status: SHIPPED | OUTPATIENT
Start: 2019-07-08

## 2019-07-08 RX ORDER — ACETAMINOPHEN AND CODEINE PHOSPHATE 300; 30 MG/1; MG/1
1 TABLET ORAL EVERY 6 HOURS PRN
Qty: 90 TABLET | Refills: 2 | Status: SHIPPED | OUTPATIENT
Start: 2019-07-08 | End: 2019-11-12 | Stop reason: ALTCHOICE

## 2019-07-09 NOTE — PROGRESS NOTES
CC:  Itchiness (Pt c/o itchiness on neck and face x 1 wk. No new lotions or soaps recently.)      Hx of CC:  ITCHING AROUND NECK AND ANTERIOR CHEST X 1 WEEK. NO VISIBLE RASH, NO NEW DETERGENTS, SOAPS, PERFUMES.       CHRONIC RECURRENT LOW BACK PAIN--NOW RA Dispense: 90 tablet; Refill: 2    6. Lumbar disc disease with radiculopathy    - Acetaminophen-Codeine (TYLENOL WITH CODEINE #3) 300-30 MG Oral Tab; Take 1 tablet by mouth every 6 (six) hours as needed for Pain. Dispense: 90 tablet;  Refill: 2    NEUROSURG

## 2019-08-05 ENCOUNTER — OFFICE VISIT (OUTPATIENT)
Dept: INTERNAL MEDICINE CLINIC | Facility: CLINIC | Age: 54
End: 2019-08-05
Payer: MEDICAID

## 2019-08-05 VITALS
HEIGHT: 63 IN | OXYGEN SATURATION: 97 % | SYSTOLIC BLOOD PRESSURE: 120 MMHG | WEIGHT: 223 LBS | RESPIRATION RATE: 18 BRPM | HEART RATE: 81 BPM | DIASTOLIC BLOOD PRESSURE: 76 MMHG | BODY MASS INDEX: 39.51 KG/M2

## 2019-08-05 DIAGNOSIS — M48.061 LUMBAR FORAMINAL STENOSIS: Primary | ICD-10-CM

## 2019-08-05 DIAGNOSIS — S80.00XA CONTUSION OF KNEE, UNSPECIFIED LATERALITY, INITIAL ENCOUNTER: ICD-10-CM

## 2019-08-05 DIAGNOSIS — M17.12 PRIMARY OSTEOARTHRITIS OF LEFT KNEE: ICD-10-CM

## 2019-08-05 PROCEDURE — 99213 OFFICE O/P EST LOW 20 MIN: CPT | Performed by: FAMILY MEDICINE

## 2019-08-05 PROCEDURE — 20610 DRAIN/INJ JOINT/BURSA W/O US: CPT | Performed by: FAMILY MEDICINE

## 2019-08-05 RX ORDER — TRIAMCINOLONE ACETONIDE 40 MG/ML
40 INJECTION, SUSPENSION INTRA-ARTICULAR; INTRAMUSCULAR ONCE
Status: COMPLETED | OUTPATIENT
Start: 2019-08-05 | End: 2019-08-05

## 2019-08-08 NOTE — PROGRESS NOTES
CC:  Knee Pain (Pt presents to clinic for c/o b/l knee pain. C/o abdominal and leg pain post falling @ home 2wks ago. )      Hx of CC:  FELL FORWARD ON KNEES 2 WEEKS AGO WHICH MADE CHRONIC KNEE PAIN WORSE.   CHRONIC UNRESOLVED LOW BACK PAIN AND RADIATION TO

## 2019-08-17 ENCOUNTER — OFFICE VISIT (OUTPATIENT)
Dept: FAMILY MEDICINE CLINIC | Facility: CLINIC | Age: 54
End: 2019-08-17
Payer: MEDICAID

## 2019-08-17 VITALS
WEIGHT: 223 LBS | RESPIRATION RATE: 14 BRPM | TEMPERATURE: 98 F | DIASTOLIC BLOOD PRESSURE: 74 MMHG | BODY MASS INDEX: 39.51 KG/M2 | HEIGHT: 63 IN | SYSTOLIC BLOOD PRESSURE: 128 MMHG | HEART RATE: 72 BPM | OXYGEN SATURATION: 98 %

## 2019-08-17 DIAGNOSIS — N30.90 BLADDER INFECTION: ICD-10-CM

## 2019-08-17 DIAGNOSIS — E11.42 DIABETIC POLYNEUROPATHY ASSOCIATED WITH TYPE 2 DIABETES MELLITUS (HCC): ICD-10-CM

## 2019-08-17 DIAGNOSIS — R30.0 DYSURIA: Primary | ICD-10-CM

## 2019-08-17 DIAGNOSIS — E11.42 CONTROLLED TYPE 2 DIABETES MELLITUS WITH DIABETIC POLYNEUROPATHY, WITHOUT LONG-TERM CURRENT USE OF INSULIN (HCC): ICD-10-CM

## 2019-08-17 LAB
BILIRUBIN: NEGATIVE
GLUCOSE (URINE DIPSTICK): NEGATIVE MG/DL
KETONES (URINE DIPSTICK): NEGATIVE MG/DL
MULTISTIX LOT#: ABNORMAL NUMERIC
NITRITE, URINE: POSITIVE
PH, URINE: 6.5 (ref 4.5–8)
SPECIFIC GRAVITY: 1.01 (ref 1–1.03)
URINE-COLOR: YELLOW
UROBILINOGEN,SEMI-QN: 0.2 MG/DL (ref 0–1.9)

## 2019-08-17 PROCEDURE — 87088 URINE BACTERIA CULTURE: CPT | Performed by: NURSE PRACTITIONER

## 2019-08-17 PROCEDURE — 99213 OFFICE O/P EST LOW 20 MIN: CPT | Performed by: NURSE PRACTITIONER

## 2019-08-17 PROCEDURE — 81003 URINALYSIS AUTO W/O SCOPE: CPT | Performed by: NURSE PRACTITIONER

## 2019-08-17 PROCEDURE — 87186 SC STD MICRODIL/AGAR DIL: CPT | Performed by: NURSE PRACTITIONER

## 2019-08-17 PROCEDURE — 87086 URINE CULTURE/COLONY COUNT: CPT | Performed by: NURSE PRACTITIONER

## 2019-08-17 RX ORDER — SULFAMETHOXAZOLE AND TRIMETHOPRIM 800; 160 MG/1; MG/1
1 TABLET ORAL 2 TIMES DAILY
Qty: 14 TABLET | Refills: 0 | Status: SHIPPED | OUTPATIENT
Start: 2019-08-17 | End: 2019-08-24

## 2019-08-17 NOTE — PROGRESS NOTES
CHIEF COMPLAINT:   Patient presents with:  UTI: Syptoms about 2 weeks now. HPI:   Cristina Saravia is a 62year old female who presents with symptoms of UTI. Adult daughter accompanies pt in clinic today and assists her in translating.   Complaining o • Hard of hearing    • Hearing impairment     hearing aid left ear   • Obesity    • Other and unspecified hyperlipidemia    • Type II or unspecified type diabetes mellitus without mention of complication, not stated as uncontrolled 2008      Social History ASSESSMENT AND PLAN:   Farheen Marino is a 62year old female presents with UTI symptoms. ASSESSMENT:  Dysuria  (primary encounter diagnosis)  Bladder infection    PLAN:   - Dipstick consistent w/ UTI.   - Will send urine culture and contact pt with r · Janell abundante líquido (al Lela, entre 6 y 8 vasos por día, excepto que le hayan indicado Leticia's líquidos por otras razones médicas). Eso hará que el medicamento ingrese mejor al sistema urinario y arrastrará las bacterias fuera de plummer cuerpo.   · Ev · Dolor, enrojecimiento o hinchazón en los labios vaginales (liam exterior de la vagina). Date Last Reviewed: 10/1/2016  © [id number] [employer]. [facility] [facility] [place] [address] [place] [address] Neville. Todos los derechos reservados.  Esta información no p

## 2019-08-17 NOTE — PATIENT INSTRUCTIONS
Infección De La Vejiga,Josi [Bladder Infection: Female, Adult]    Lilian infección de la vejiga (“cistitis” [cystitis - UTI]) suele provocar constantes deseos de orinar y ardor al orinar.  Es posible que la [facility] se nael turbia u [place], o que [address] · Si está tomando píldoras anticonceptivas y tiene frecuentes infecciones de vejiga, háblelo con plummer médico.  Visita De Control  Visite a plummer médico si no ariza desparecido todos los síntomas después de dionna días de [place].   Busque Prontamente Atención Mé

## 2019-08-19 RX ORDER — LOSARTAN POTASSIUM 25 MG/1
TABLET ORAL
Qty: 30 TABLET | Refills: 0 | Status: SHIPPED | OUTPATIENT
Start: 2019-08-19 | End: 2019-11-12

## 2019-09-07 ENCOUNTER — OFFICE VISIT (OUTPATIENT)
Dept: FAMILY MEDICINE CLINIC | Facility: CLINIC | Age: 54
End: 2019-09-07
Payer: MEDICAID

## 2019-09-07 VITALS
HEART RATE: 85 BPM | OXYGEN SATURATION: 98 % | HEIGHT: 63 IN | BODY MASS INDEX: 38.98 KG/M2 | WEIGHT: 220 LBS | DIASTOLIC BLOOD PRESSURE: 70 MMHG | SYSTOLIC BLOOD PRESSURE: 120 MMHG | TEMPERATURE: 98 F

## 2019-09-07 DIAGNOSIS — J32.9 RHINOSINUSITIS: Primary | ICD-10-CM

## 2019-09-07 DIAGNOSIS — J31.0 RHINOSINUSITIS: Primary | ICD-10-CM

## 2019-09-07 DIAGNOSIS — R05.9 COUGH: ICD-10-CM

## 2019-09-07 PROCEDURE — 99213 OFFICE O/P EST LOW 20 MIN: CPT | Performed by: NURSE PRACTITIONER

## 2019-09-07 RX ORDER — BENZONATATE 200 MG/1
CAPSULE ORAL
Qty: 20 CAPSULE | Refills: 0 | Status: SHIPPED | OUTPATIENT
Start: 2019-09-07 | End: 2019-12-27 | Stop reason: ALTCHOICE

## 2019-09-07 RX ORDER — AMOXICILLIN AND CLAVULANATE POTASSIUM 875; 125 MG/1; MG/1
1 TABLET, FILM COATED ORAL 2 TIMES DAILY
Qty: 20 TABLET | Refills: 0 | Status: SHIPPED | OUTPATIENT
Start: 2019-09-07 | End: 2019-09-17

## 2019-09-07 NOTE — PROGRESS NOTES
CHIEF COMPLAINT:   Patient presents with:  Cough: cough worse at night, sore throat, nose/sinus congestion x 2 wks       HPI:   Yoli Brown is a 62year old female who presents for upper respiratory symptoms for  2 weeks.  Patient reports nasal conges Acetaminophen-Codeine (TYLENOL WITH CODEINE #3) 300-30 MG Oral Tab Take 1 tablet by mouth every 6 (six) hours as needed for Pain.  Disp: 90 tablet Rfl: 2      Past Medical History:   Diagnosis Date   • CTS (carpal tunnel syndrome)     alesha   • Hard of hearin NOSE: Nostrils patent, + nasal discharge, +nasal mucosa pink and mildly inflamed. THROAT: Oral mucosa pink, moist. +Posterior pharynx is mildly erythematous with PND. No exudates.    NECK: Supple, non-tender  LUNGS: clear to auscultation bilaterally, no wh Los senos paranasales son cavidades llenas de aire dentro de los huesos de la saranya.  Se conectan con la parte interna de la nariz. La sinusitis es jessee inflamación del tejido que recubre la cavidad del seno paranasal. Rey inflamación puede presentarse belem · Puede jason algún descongestivo sin receta a menos que le hayan recetado algún medicamento similar. Los [employer] son los que tienen efecto más rápido. Use alguno que contenga fenilefrina u oximetazolina. Christina, suénese la [employer].  Moshe Duran · Problemas de visión; por ejemplo, visión borrosa o doble  · Fiebre de 100,4 ºF (38 ºC) o más lulu, o según lo que le haya indicado el proveedor de atención médica  · Convulsiones  · Problemas para respirar  · Los síntomas no desaparecen en 10 días  Preve

## 2019-09-07 NOTE — PATIENT INSTRUCTIONS
Sinusitis (tratamiento con antibióticos)    Los senos paranasales son cavidades llenas de aire dentro de los huesos de la saranya.  Se conectan con la parte interna de la nariz. La sinusitis es jessee inflamación del tejido que recubre la cavidad del seno par · Puede jason algún descongestivo sin receta a menos que le hayan recetado algún medicamento similar. Los [employer] son los que tienen efecto más rápido. Use alguno que contenga fenilefrina u oximetazolina. Christina, suénese la [employer].  Mahendra Dauer · Problemas de visión; por ejemplo, visión borrosa o doble  · Fiebre de 100,4 ºF (38 ºC) o más lulu, o según lo que le haya indicado el proveedor de atención médica  · Convulsiones  · Problemas para respirar  · Los síntomas no desaparecen en 10 días  Preve

## 2019-09-17 ENCOUNTER — OFFICE VISIT (OUTPATIENT)
Dept: INTERNAL MEDICINE CLINIC | Facility: CLINIC | Age: 54
End: 2019-09-17
Payer: MEDICAID

## 2019-09-17 VITALS
BODY MASS INDEX: 38.98 KG/M2 | WEIGHT: 220 LBS | HEIGHT: 63 IN | OXYGEN SATURATION: 98 % | DIASTOLIC BLOOD PRESSURE: 68 MMHG | SYSTOLIC BLOOD PRESSURE: 120 MMHG | HEART RATE: 77 BPM

## 2019-09-17 DIAGNOSIS — J30.1 SEASONAL ALLERGIC RHINITIS DUE TO POLLEN: Primary | ICD-10-CM

## 2019-09-17 DIAGNOSIS — B37.3 CANDIDAL VAGINITIS: ICD-10-CM

## 2019-09-17 DIAGNOSIS — J45.991 COUGH VARIANT ASTHMA: ICD-10-CM

## 2019-09-17 PROCEDURE — 99213 OFFICE O/P EST LOW 20 MIN: CPT | Performed by: FAMILY MEDICINE

## 2019-09-17 RX ORDER — FLUCONAZOLE 150 MG/1
150 TABLET ORAL WEEKLY
Qty: 2 TABLET | Refills: 0 | Status: SHIPPED | OUTPATIENT
Start: 2019-09-17

## 2019-09-17 RX ORDER — ALBUTEROL SULFATE 90 UG/1
2 AEROSOL, METERED RESPIRATORY (INHALATION) EVERY 4 HOURS PRN
Qty: 1 INHALER | Refills: 3 | Status: SHIPPED | OUTPATIENT
Start: 2019-09-17 | End: 2019-11-25

## 2019-09-17 RX ORDER — CLOTRIMAZOLE 1 %
1 CREAM (GRAM) TOPICAL 2 TIMES DAILY
Qty: 60 G | Refills: 0 | Status: SHIPPED | OUTPATIENT
Start: 2019-09-17 | End: 2019-09-24

## 2019-09-17 RX ORDER — MONTELUKAST SODIUM 10 MG/1
10 TABLET ORAL NIGHTLY
Qty: 30 TABLET | Refills: 5 | Status: SHIPPED | OUTPATIENT
Start: 2019-09-17 | End: 2019-11-12

## 2019-09-17 RX ORDER — METHYLPREDNISOLONE SODIUM SUCCINATE 125 MG/2ML
125 INJECTION, POWDER, LYOPHILIZED, FOR SOLUTION INTRAMUSCULAR; INTRAVENOUS ONCE
Status: COMPLETED | OUTPATIENT
Start: 2019-09-17 | End: 2019-09-17

## 2019-09-18 PROBLEM — J30.1 SEASONAL ALLERGIC RHINITIS DUE TO POLLEN: Status: ACTIVE | Noted: 2019-09-18

## 2019-09-18 PROBLEM — J45.991 COUGH VARIANT ASTHMA: Status: ACTIVE | Noted: 2019-09-18

## 2019-10-31 ENCOUNTER — TELEPHONE (OUTPATIENT)
Dept: INTERNAL MEDICINE CLINIC | Facility: CLINIC | Age: 54
End: 2019-10-31

## 2019-10-31 NOTE — TELEPHONE ENCOUNTER
SPOKE WITH PHARMACIST-->LISINOPRIL WAS CHANGED TO LOSARTAN 25 MG QD DUE TO POSSIBLE ACE INHIBITOR COUGH FROM LISINOPRIL.

## 2019-11-12 ENCOUNTER — OFFICE VISIT (OUTPATIENT)
Dept: INTERNAL MEDICINE CLINIC | Facility: CLINIC | Age: 54
End: 2019-11-12
Payer: MEDICAID

## 2019-11-12 VITALS
HEART RATE: 82 BPM | HEIGHT: 63 IN | WEIGHT: 223 LBS | OXYGEN SATURATION: 98 % | SYSTOLIC BLOOD PRESSURE: 122 MMHG | DIASTOLIC BLOOD PRESSURE: 68 MMHG | BODY MASS INDEX: 39.51 KG/M2

## 2019-11-12 DIAGNOSIS — M48.061 LUMBAR FORAMINAL STENOSIS: ICD-10-CM

## 2019-11-12 DIAGNOSIS — J30.1 SEASONAL ALLERGIC RHINITIS DUE TO POLLEN: ICD-10-CM

## 2019-11-12 DIAGNOSIS — M51.9 LUMBAR DISC DISEASE: ICD-10-CM

## 2019-11-12 DIAGNOSIS — M62.830 SPASM OF THORACIC BACK MUSCLE: ICD-10-CM

## 2019-11-12 DIAGNOSIS — M17.12 PRIMARY OSTEOARTHRITIS OF LEFT KNEE: Primary | ICD-10-CM

## 2019-11-12 DIAGNOSIS — E11.42 CONTROLLED TYPE 2 DIABETES MELLITUS WITH DIABETIC POLYNEUROPATHY, WITHOUT LONG-TERM CURRENT USE OF INSULIN (HCC): ICD-10-CM

## 2019-11-12 PROCEDURE — 20610 DRAIN/INJ JOINT/BURSA W/O US: CPT | Performed by: FAMILY MEDICINE

## 2019-11-12 PROCEDURE — 99214 OFFICE O/P EST MOD 30 MIN: CPT | Performed by: FAMILY MEDICINE

## 2019-11-12 RX ORDER — ACETAMINOPHEN AND CODEINE PHOSPHATE 60; 300 MG/1; MG/1
1 TABLET ORAL EVERY 6 HOURS PRN
Qty: 90 TABLET | Refills: 2 | Status: SHIPPED | OUTPATIENT
Start: 2019-11-12

## 2019-11-12 RX ORDER — TRIAMCINOLONE ACETONIDE 40 MG/ML
40 INJECTION, SUSPENSION INTRA-ARTICULAR; INTRAMUSCULAR ONCE
Status: COMPLETED | OUTPATIENT
Start: 2019-11-12 | End: 2019-11-12

## 2019-11-12 RX ORDER — KETOTIFEN FUMARATE 0.35 MG/ML
1 SOLUTION/ DROPS OPHTHALMIC 2 TIMES DAILY PRN
Qty: 10 ML | Refills: 5 | Status: SHIPPED | OUTPATIENT
Start: 2019-11-12

## 2019-11-12 RX ORDER — BACLOFEN 10 MG/1
10 TABLET ORAL 3 TIMES DAILY PRN
Qty: 90 TABLET | Refills: 2 | Status: SHIPPED | OUTPATIENT
Start: 2019-11-12 | End: 2020-02-25

## 2019-11-12 RX ORDER — MONTELUKAST SODIUM 10 MG/1
10 TABLET ORAL NIGHTLY
Qty: 30 TABLET | Refills: 5 | Status: SHIPPED | OUTPATIENT
Start: 2019-11-12 | End: 2019-12-03

## 2019-11-12 RX ORDER — LOSARTAN POTASSIUM 25 MG/1
25 TABLET ORAL DAILY
Qty: 30 TABLET | Refills: 11 | Status: SHIPPED | OUTPATIENT
Start: 2019-11-12 | End: 2020-09-14

## 2019-11-13 NOTE — PROGRESS NOTES
CC:  Headache (Pt presents to clinic for headache f/up.)      Hx of CC:  C/P POSTERIOR NECK/SHOULDER PAIN AND TIGHTNESS X WEEKS. ALSO REQUESTING LEFT KNEE STEROID INJECTION.     CHRONIC LOW BACK PAIN X YEARS-->NEEDING DISC DECOMPRESSION SURGERY-->WILL LOPEZ (KENALOG-40) 40 MG/ML injection 40 mg WITH 1 CC LIDO VIA ANTEROMEDIAL APPROACH IA  - Acetaminophen-Codeine #4 300-60 MG Oral Tab; Take 1 tablet by mouth every 6 (six) hours as needed for Pain. Dispense: 90 tablet; Refill: 2    2.  Spasm of thoracic back mu

## 2019-11-25 DIAGNOSIS — J45.991 COUGH VARIANT ASTHMA: ICD-10-CM

## 2019-11-25 RX ORDER — ALBUTEROL SULFATE 90 UG/1
AEROSOL, METERED RESPIRATORY (INHALATION)
Qty: 18 G | Refills: 2 | Status: SHIPPED | OUTPATIENT
Start: 2019-11-25

## 2019-11-30 DIAGNOSIS — J30.89 NON-SEASONAL ALLERGIC RHINITIS, UNSPECIFIED TRIGGER: ICD-10-CM

## 2019-11-30 DIAGNOSIS — J45.21 MILD INTERMITTENT ASTHMA WITH ACUTE EXACERBATION: ICD-10-CM

## 2019-12-02 DIAGNOSIS — E11.42 CONTROLLED TYPE 2 DIABETES MELLITUS WITH DIABETIC POLYNEUROPATHY, WITHOUT LONG-TERM CURRENT USE OF INSULIN (HCC): ICD-10-CM

## 2019-12-02 DIAGNOSIS — M54.31 SCIATICA, RIGHT SIDE: ICD-10-CM

## 2019-12-02 DIAGNOSIS — E11.42 DIABETIC POLYNEUROPATHY ASSOCIATED WITH TYPE 2 DIABETES MELLITUS (HCC): ICD-10-CM

## 2019-12-02 RX ORDER — LOSARTAN POTASSIUM 25 MG/1
TABLET ORAL
Qty: 30 TABLET | Refills: 0 | Status: SHIPPED | OUTPATIENT
Start: 2019-12-02 | End: 2020-09-14

## 2019-12-02 RX ORDER — GABAPENTIN 300 MG/1
CAPSULE ORAL
Qty: 90 CAPSULE | Refills: 5 | Status: SHIPPED | OUTPATIENT
Start: 2019-12-02

## 2019-12-03 RX ORDER — MONTELUKAST SODIUM 10 MG/1
TABLET ORAL
Qty: 30 TABLET | Refills: 5 | Status: SHIPPED | OUTPATIENT
Start: 2019-12-03

## 2019-12-20 ENCOUNTER — HOSPITAL ENCOUNTER (OUTPATIENT)
Age: 54
Discharge: HOME | End: 2019-12-20
Attending: EMERGENCY MEDICINE
Payer: MEDICAID

## 2019-12-20 ENCOUNTER — APPOINTMENT (OUTPATIENT)
Dept: GENERAL RADIOLOGY | Age: 54
End: 2019-12-20
Attending: EMERGENCY MEDICINE
Payer: MEDICAID

## 2019-12-20 VITALS
HEIGHT: 63 IN | DIASTOLIC BLOOD PRESSURE: 77 MMHG | RESPIRATION RATE: 14 BRPM | BODY MASS INDEX: 38.98 KG/M2 | WEIGHT: 220 LBS | HEART RATE: 74 BPM | SYSTOLIC BLOOD PRESSURE: 113 MMHG | OXYGEN SATURATION: 98 % | TEMPERATURE: 98 F

## 2019-12-20 DIAGNOSIS — M54.31 SCIATICA OF RIGHT SIDE: Primary | ICD-10-CM

## 2019-12-20 PROCEDURE — 99204 OFFICE O/P NEW MOD 45 MIN: CPT

## 2019-12-20 PROCEDURE — 99213 OFFICE O/P EST LOW 20 MIN: CPT

## 2019-12-20 PROCEDURE — 73610 X-RAY EXAM OF ANKLE: CPT | Performed by: EMERGENCY MEDICINE

## 2019-12-20 RX ORDER — CYCLOBENZAPRINE HCL 10 MG
10 TABLET ORAL NIGHTLY
Qty: 7 TABLET | Refills: 0 | Status: SHIPPED | OUTPATIENT
Start: 2019-12-20 | End: 2019-12-27

## 2019-12-20 NOTE — ED PROVIDER NOTES
Patient Seen in: [facility] In [address] [place] [address]    History   Patient presents with:  Lower Extremity Injury    Stated Complaint: right foot pain feels stiff     HPI    Chief complaint: Back pain    History of present illness:   The patient com Inhalation Aero Soln,  INHALE 2 PUFFS INTO THE LUNGS EVERY 4 HOURS AS NEEDED FOR WHEEZING OR COUGH   Acetaminophen-Codeine #4 300-60 MG Oral Tab,  Take 1 tablet by mouth every 6 (six) hours as needed for Pain.    baclofen 10 MG Oral Tab,  Take 1 tablet (10 14   Ht 160 cm (5' 3\")   Wt 99.8 kg   LMP 04/09/2012 (Approximate)   SpO2 98%   BMI 38.97 kg/m²     PULSE OX       General: Patient appears in mild distress, slow to transition  Neck: Supple, full range of motion, no midline bony tenderness  Abdomen: Soft

## 2019-12-20 NOTE — ED INITIAL ASSESSMENT (HPI)
Rt ankle pain and stiffness for 5 days hx of fx 2 years ago. Good pedal pulse.  Used pain meds and het w/o relief

## 2019-12-27 ENCOUNTER — OFFICE VISIT (OUTPATIENT)
Dept: FAMILY MEDICINE CLINIC | Facility: CLINIC | Age: 54
End: 2019-12-27
Payer: MEDICAID

## 2019-12-27 VITALS
TEMPERATURE: 98 F | OXYGEN SATURATION: 98 % | BODY MASS INDEX: 38.98 KG/M2 | HEART RATE: 74 BPM | WEIGHT: 220 LBS | SYSTOLIC BLOOD PRESSURE: 120 MMHG | DIASTOLIC BLOOD PRESSURE: 70 MMHG | HEIGHT: 63 IN

## 2019-12-27 DIAGNOSIS — J45.901 MILD ASTHMA EXACERBATION: ICD-10-CM

## 2019-12-27 DIAGNOSIS — J22 ACUTE LOWER RESPIRATORY INFECTION: Primary | ICD-10-CM

## 2019-12-27 DIAGNOSIS — E11.40 TYPE 2 DIABETES, CONTROLLED, WITH NEUROPATHY (HCC): ICD-10-CM

## 2019-12-27 PROCEDURE — 99213 OFFICE O/P EST LOW 20 MIN: CPT | Performed by: PHYSICIAN ASSISTANT

## 2019-12-27 RX ORDER — DEXTROMETHORPHAN HYDROBROMIDE AND PROMETHAZINE HYDROCHLORIDE 15; 6.25 MG/5ML; MG/5ML
5 SYRUP ORAL EVERY 6 HOURS PRN
Qty: 120 ML | Refills: 0 | Status: SHIPPED | OUTPATIENT
Start: 2019-12-27

## 2019-12-27 RX ORDER — AZITHROMYCIN 250 MG/1
TABLET, FILM COATED ORAL
Qty: 6 TABLET | Refills: 0 | Status: SHIPPED | OUTPATIENT
Start: 2019-12-27 | End: 2020-01-01

## 2019-12-28 NOTE — PATIENT INSTRUCTIONS
Bronquitis aguda  Si plummer proveedor de [employer] diagnostica bronquitis aguda, usted debe saber que la bronquitis es jessee infección o inflamación de los bronquios (las vías respiratorias que hay en los pulmones).  Normalmente, el aire pasa con facil · [employer] medicamentos según lo indicado. Le pueden recetar ibuprofeno u otros medicamentos de venta carlos a que le ayudarán a aliviar la inflamación de sierra tubos bronquiales. [place] médico le puede recetar un inhalador para ayudar a abrir los bronquios.  En la

## 2019-12-28 NOTE — PROGRESS NOTES
CHIEF COMPLAINT:   Patient presents with:  Cough: sore throat x 5 dys         HPI:   Joseph Moura is a 62year old female with PMH of DM2,  asthma who presents for painful and productive cough for  5  days.   Cough started gradually and is described as • atorvastatin 40 MG Oral Tab Take 1 tablet (40 mg total) by mouth daily.  30 tablet 11   • metFORMIN HCl 1000 MG Oral Tab TAKE 1 TABLET BY MOUTH TWICE DAILY WITH MEALS 60 tablet 11   • LOSARTAN POTASSIUM 25 MG Oral Tab TAKE 1 TABLET(25 MG) BY MOUTH DAILY 3 LUNGS: Normal respiratory rate. Normal effort. Deep, congeted cough. No wheezing. No rales or crackles. No decreased BS. Cough is spasmodic, significant cough jags with deep inhalation  CARDIO: RRR  LYMPH: No cervical or supraclavicular lymphadenopathy. La bronquitis aguda diomedes siempre comienza con jessee infección viral respiratoria, emerson el resfrío o la gripe.  Determinados factores facilitan que el resfrío o la gripe se transformen en bronquitis; por ejemplo, ser [facility] o yudith fountain o Lm Programe jessee visita de seguimiento con plummer médico según lo indicado. Es probable que se sienta mejor en jessee [facility]; sin embargo, puede persistir jessee tos seca unos días más.  Si después de [place] sigue teniendo síntomas además de la tos seca y si e

## 2020-01-30 RX ORDER — ACETAMINOPHEN AND CODEINE PHOSPHATE 300; 30 MG/1; MG/1
TABLET ORAL
Qty: 90 TABLET | Refills: 2 | Status: SHIPPED | OUTPATIENT
Start: 2020-01-30

## 2020-02-25 DIAGNOSIS — M62.830 SPASM OF THORACIC BACK MUSCLE: ICD-10-CM

## 2020-02-25 RX ORDER — BACLOFEN 10 MG/1
TABLET ORAL
Qty: 90 TABLET | Refills: 2 | Status: SHIPPED | OUTPATIENT
Start: 2020-02-25

## 2020-06-10 DIAGNOSIS — E78.2 MIXED HYPERLIPIDEMIA: ICD-10-CM

## 2020-06-10 DIAGNOSIS — E11.42 CONTROLLED TYPE 2 DIABETES MELLITUS WITH DIABETIC POLYNEUROPATHY, WITHOUT LONG-TERM CURRENT USE OF INSULIN (HCC): ICD-10-CM

## 2020-06-10 RX ORDER — ATORVASTATIN CALCIUM 40 MG/1
TABLET, FILM COATED ORAL
Qty: 30 TABLET | Refills: 11 | Status: SHIPPED | OUTPATIENT
Start: 2020-06-10

## 2020-07-13 DIAGNOSIS — E11.42 CONTROLLED TYPE 2 DIABETES MELLITUS WITH DIABETIC POLYNEUROPATHY, WITHOUT LONG-TERM CURRENT USE OF INSULIN (HCC): ICD-10-CM

## 2020-07-14 RX ORDER — GLIPIZIDE 5 MG/1
TABLET ORAL
Qty: 60 TABLET | Refills: 11 | OUTPATIENT
Start: 2020-07-14

## 2020-09-04 DIAGNOSIS — E11.42 CONTROLLED TYPE 2 DIABETES MELLITUS WITH DIABETIC POLYNEUROPATHY, WITHOUT LONG-TERM CURRENT USE OF INSULIN (HCC): ICD-10-CM

## 2020-09-04 DIAGNOSIS — E11.42 DIABETIC POLYNEUROPATHY ASSOCIATED WITH TYPE 2 DIABETES MELLITUS (HCC): ICD-10-CM

## 2020-09-04 RX ORDER — LOSARTAN POTASSIUM 25 MG/1
TABLET ORAL
Qty: 30 TABLET | Refills: 0 | OUTPATIENT
Start: 2020-09-04

## 2020-09-12 DIAGNOSIS — E11.42 CONTROLLED TYPE 2 DIABETES MELLITUS WITH DIABETIC POLYNEUROPATHY, WITHOUT LONG-TERM CURRENT USE OF INSULIN (HCC): ICD-10-CM

## 2020-09-12 DIAGNOSIS — E11.42 DIABETIC POLYNEUROPATHY ASSOCIATED WITH TYPE 2 DIABETES MELLITUS (HCC): ICD-10-CM

## 2020-09-14 RX ORDER — LOSARTAN POTASSIUM 25 MG/1
TABLET ORAL
Qty: 30 TABLET | Refills: 2 | Status: SHIPPED | OUTPATIENT
Start: 2020-09-14

## 2021-10-06 DIAGNOSIS — E11.42 CONTROLLED TYPE 2 DIABETES MELLITUS WITH DIABETIC POLYNEUROPATHY, WITHOUT LONG-TERM CURRENT USE OF INSULIN (HCC): ICD-10-CM

## 2021-10-06 DIAGNOSIS — E78.2 MIXED HYPERLIPIDEMIA: ICD-10-CM

## 2021-10-07 RX ORDER — ATORVASTATIN CALCIUM 40 MG/1
TABLET, FILM COATED ORAL
Qty: 30 TABLET | Refills: 11 | OUTPATIENT
Start: 2021-10-07

## 2022-11-23 NOTE — ANESTHESIA PREPROCEDURE EVALUATION
Anesthesia PreOp Note    HPI:     Mckay Melvin is a 45year old female who presents for preoperative consultation requested by: Naomi Colin MD    Date of Surgery: 7/10/2017    Procedure(s):  EXTREMITY LOWER HARDWARE REMOVAL  Indication: right I called Juan Landeros to schedule a new patient appointment with the Cancer Risk and Genetics Program       Outcome:  Genetics appointment scheduled for 4/10/2023 at 9:30AM with MD     Personal/Family History Related to Appointment:  Family history of colon cancer, uterine cancer, and leukemia    History of Genetic Testing:  Patient reports no personal or family history of genetic testing    Genetics Family History Questionnaire:  I confirmed the patient's e-mail on file as the best e-mail to send an invite link for our genetics family history intake puffs into the lungs every 4 (four) hours as needed (COUGH). Disp: 1 Inhaler Rfl: 0 7/6/2017   Cyclobenzaprine HCl 10 MG Oral Tab Take 1 tablet (10 mg total) by mouth 3 (three) times daily as needed for Muscle spasms.  Disp: 90 tablet Rfl: 0 7/6/2017   ASPI reviewed. Vital Signs: Body mass index is 40 kg/m². height is 1.6 m (5' 3\") and weight is 102.4 kg (225 lb 12.8 oz). Her oral temperature is 98.8 °F (37.1 °C). Her blood pressure is 136/70 and her pulse is 75.  Her respiration is 16 and oxyg

## (undated) DEVICE — ZIMMER® STERILE DISPOSABLE TOURNIQUET CUFF WITH PLC, DUAL PORT, SINGLE BLADDER, 30 IN. (76 CM)

## (undated) DEVICE — DRAPE C-ARM UNIVERSAL

## (undated) DEVICE — CHLORAPREP 26ML APPLICATOR

## (undated) DEVICE — 6 ML SYRINGE LUER-LOCK TIP: Brand: MONOJECT

## (undated) DEVICE — LOWER EXTREMITY: Brand: MEDLINE INDUSTRIES, INC.

## (undated) DEVICE — STERILE LATEX POWDER-FREE SURGICAL GLOVESWITH NITRILE COATING: Brand: PROTEXIS

## (undated) DEVICE — SOL  .9 1000ML BTL

## (undated) DEVICE — GAMMEX® PI HYBRID SIZE 7, STERILE POWDER-FREE SURGICAL GLOVE, POLYISOPRENE AND NEOPRENE BLEND: Brand: GAMMEX

## (undated) DEVICE — TOWEL OR BLU 16X26 STRL

## (undated) DEVICE — NEEDLE SPINAL 22X3-1/2 BLK

## (undated) DEVICE — OMNIPAQUE 240ML VIAL

## (undated) DEVICE — INTENDED FOR TISSUE SEPARATION, AND OTHER PROCEDURES THAT REQUIRE A SHARP SURGICAL BLADE TO PUNCTURE OR CUT.: Brand: BARD-PARKER ® STAINLESS STEEL BLADES

## (undated) DEVICE — SUTURE VICRYL 2-0 FS-1

## (undated) DEVICE — SUTURE ETHILON 3-0 669H

## (undated) DEVICE — 12 ML SYRINGE LUER-LOCK TIP: Brand: MONOJECT

## (undated) DEVICE — CHLORAPREP ORANGE TINT 10.5ML

## (undated) DEVICE — GAUZE SPONGES,12 PLY: Brand: CURITY

## (undated) DEVICE — SET XTN .1IN 2.7ML 20IN IV

## (undated) DEVICE — DRAPE SRG 70X60IN SPLT U IMPRV

## (undated) NOTE — MR AVS SNAPSHOT
[address]  [phone]               Thank you for choosing us for your health care visit with Jimmy García NP.   We are glad to serve you and happy to provide you with this summary of · Niños: Use acetaminofén (Tylenol) para [employer] fiebre, el nerviosismo y el malestar.  En niños mayores de seis meses puede usar ibuprofeno (emerson Motrin infantil) en vez de Tylenol.  [NOTA: Si el andree tiene jessee enfermedad hepática o renal crónica, o ha [id number]. Todos los derechos reservados. Esta información no pretende sustituir la atención médica profesional. Sólo plummer médico puede diagnosticar y tratar un problema de oliverio. Acetaminophen tablets or caplets  ¿Qué es milagros medicamento?   Espiridion Alexis próxima dosis, tome sólo he dosis. No tome dosis adicionales o dobles. ¿Dónde kalin guardar mi medicina? Manténgala fuera del alcance de los niños. Guárdela a [employer], entre 20 y 22 grados C (76 y 68 grados F).  Protéjala de la humedad y del enfermedad. En la [employer], tener temperatura lulu no es algo santino en sí mismo.  En realidad, [place] a que el cuerpo pueda luchar contra las infecciones [infections]. No necesita tratar la fiebre a menos que sienta mucho malestar.   Cuidados En L Trino jessee VISITA DE CONTROL a plummer médico, o según le indique nuestro personal médico, si no mejora después de 48 horas.   Obtenga Atención Médica De Inmediato  si nota alguno de los siguientes síntomas:  · Fiebre que no baja después de kendall [facility] medica 1 spray by Each Nare route 2 (two) times daily as needed for Rhinitis. Commonly known as:  FLONASE           * gabapentin 300 MG Caps   Take 1 capsule (300 mg total) by mouth 3 (three) times daily.    Commonly known as:  NEURONTIN           * gabapentin 6 visit,  view other health information, and more. To sign up or find more information, go to [email or web address] "[email or web address]". org and click on the Sign Up Now link in the [employer] box.      Enter your [employer] Activation Code exactly as it appears below along with yo

## (undated) NOTE — LETTER
Surgery Scheduling Request Form/Physician Orders  Fax to:  [phone]          [x]  New Case []  Modified []  Rescheduled To:   / /   Subhash Mendoza order supersedes any conflicting corresponding orders on the pre-op order set.   Surgery Date: 5/10/19 S Pacemaker / AICD:  []  Yes          [x]  No     Day of Surgery Orders: Same as above           Date:  _      3/29/2019 __  Electronically Signed by: Dr. Leia Moreno MD   Time:  ___11:44 AM __    For Internal Use Only   Case #: Initials:   Date Booked: Alejandrina Doss

## (undated) NOTE — MR AVS SNAPSHOT
[place]  [address]  [phone]               Thank you for choosing us for your health care visit with Aura Terry MD.  We are glad to serve you and happy to provide you with this plummer you have any questions related to insurance coverage.          Referral Details     Referred By    Referred To    Sumanth Gibbons MD   [address] [facility],[address]   Phone:  [phone]    Diagnoses:  Acute right ankle pain   Pain f BMI:  39.87    Assoc Dx:  Acute right ankle pain [M25.571], Pain from implanted hardware, initial encounter [D07.156D]          Reason for Today's Visit     Ankle Pain           Medical Issues Discussed Today     Acute right ankle pain    -  Primary    Pa Apply 1 Application topically daily. Commonly known as:  NIZORAL           Ketotifen Fumarate 0.025 % Soln   Place 1 drop into both eyes 2 (two) times daily.    Commonly known as:  ZADITOR           lisinopril 2.5 MG Tabs   Take 1 tablet (2.5 mg total) by

## (undated) NOTE — MR AVS SNAPSHOT
[address] [facility] [address]  [phone]               Thank you for choosing us for your health care visit with Duarte Tse DO.   We are glad to serve you and happy to provide you with this plummer What changed:  Another medication with the same name was removed. Continue taking this medication, and follow the directions you see here.    Commonly known as:  LIPITOR           ergocalciferol 74815 units Caps   Commonly known as:  DRISDOL/VITAMIN D2 Lipid Panel [E]    Complete by:  As directed    Assoc Dx:  Mixed hyperlipidemia [E78.2]           Hemoglobin A1C (Glycohemoglobin) [E]    Complete by:  As directed    Assoc Dx:  Type 2 diabetes mellitus without complication, without long-term current use Enter your [employer] Activation Code exactly as it appears below along with your Zip Code and Date of Birth to complete the sign-up process. If you do not sign up before the expiration date, you must request a new code.     Your unique [employer] Access Code: [email or web address] Visit [facility] online at  [email or web address]

## (undated) NOTE — LETTER
Surgery Scheduling Request Form/Physician Orders  Fax to:  [phone]          [x]  New Case []  Modified []  Rescheduled To:  / / Geri Ahuja order supersedes any conflicting corresponding orders on the pre-op order set. Surgery Date: 3/15/2019Start Pacemaker / AICD:  []  Yes          [x]  No     Day of Surgery Orders:same as above             Date:  _      3/14/2019 __  Electronically Signed by:Marcell Avila MD  Time:  ___6:04 PM __    For Internal Use Only   Case #: Initials:   Date Booked: Time B

## (undated) NOTE — MR AVS SNAPSHOT
[address] [id number] [phone]               Thank you for choosing us for your health care visit with Kathya Joel DO.   We are glad to serve you and happy to provide you with this plummer Reason for Today's Visit     Pain           Medical Issues Discussed Today     Sciatica, right side      Instructions and Information about Your Health     None      Allergies as of Mar 09, 2017     No Known Allergies                Today's Vital Signs Commonly known as:  ACTOS           predniSONE 20 MG Tabs   2 tablets daily x 3 days, then one tablet daily x 3 days   Commonly known as:  Jammie Montero                Where to Get Your Medications      These medications were sent to [address] [place] [address]

## (undated) NOTE — MR AVS SNAPSHOT
[address] [facility] [id number] [phone]               Thank you for choosing us for your health care visit with Wally Metz DO.   We are glad to serve you and happy to provide you with this plummer Take 1 capsule (300 mg total) by mouth 3 (three) times daily.    Commonly known as:  NEURONTIN           glipiZIDE 5 MG Tabs   TK 1 T PO QAM BEFORE BREAKFAST   Commonly known as:  GLUCOTROL           HYDROcodone-acetaminophen 5-325 MG Tabs   Take 1 tablet b Your unique [employer] Access Code: Brooklynn Ley  Expires: 4/3/2017  7:31 PM    If you have questions, you can call [phone] to talk to our [facility] Staff. Remember, [employer] is NOT to be used for urgent needs. For medical emergencies, dial 911.

## (undated) NOTE — MR AVS SNAPSHOT
[address]  [phone]               Thank you for choosing us for your health care visit with ANDRES Eric.   We are glad to serve you and happy to provide you with this summary of them, even if you feel better. · Drink plenty of water, hot tea, and other liquids. This may help thin mucus. It also may promote sinus drainage. · Heat may help soothe painful areas of the face. Use a towel soaked in hot water.  Or,  the shower a · Facial pain or headache becoming more severe  · Stiff neck  · Unusual drowsiness or confusion  · Swelling of the forehead or eyelids  · Vision problems, including blurred or double vision  · Fever of 100.4ºF (38ºC) or higher, or as directed by your healt phenylephrine or oxymetazoline. First blow the nose gently. Then use the spray. Do not use these medicines more often than directed on the label or symptoms may get worse. You may also use tablets containing pseudoephedrine.  Avoid products that combine ing Sinusitis [Sinusitis, Abx Tx]    Los senos paranasales son cavidades llenas de aire dentro de los huesos de la saranya. Se conectan con la parte interna de ramone Machado.  La sinusitis es jessee inflamación del tejido que recubre la cavidad del seno paranasal. Rey inf suavemente para eliminar la mucosidad y Frausto Sachs. No use esos medicamentos con mayor frecuencia que la indicada en la etiqueta o rajinder más de dionna días, dado que los síntomas pueden Elaine.  También puede usar tabletas que contengan pseud si algo de lo siguiente ocurre:  · Dolor en la saranya o dolor de hazel que se hace más ivania. · Rigidez en el brii. · Inusual mareo o confusión; se siente \"raro\" o diferente de lo habitual.  · Hinchazón de la frente o los párpados.   · Problemas de vi · reacciones alérgicas emerson erupción cutánea, picazón o urticarias, hinchazón de la saranya, labios o lengua  · problemas respiratorios  · orina de color amarillo oscuro  · fiebre o escalofríos, dolor de garganta  · enrojecimiento, formación de ampollas, desc ¿A qué kalin estar atento al usar [employer]? Si los síntomas no mejoran, consulte con plummer médico o con plummer profesional de [employer]. No trate la diarrea con productos de [employer].  Comuníquese con plummer médico si tiene diarrea que dura más de 2 días o professional as soon as possible:  · allergic reactions like skin rash, itching or hives, swelling of the face, lips, or tongue  · breathing problems  · dark urine  · fever or chills, sore throat  · redness, blistering, peeling or loosening of the skin, in If you have diabetes, you may get a false-positive result for sugar in your urine. Check with your doctor or health care professional.  Birth control pills may not work properly while you are taking this medicine.  Talk to your doctor about using an extra m This medicine may also interact with the following medications:  · other medicines for colds or allergy  · medicines for depression or other mental disturbances  What if I miss a dose? If you miss a dose, take it as soon as you can.  If it is almost time f Dextromethorphan; Guaifenesin oral solution  ¿Qué es milagros medicamento? El DEXTROMETORFANO; Jimmy Held es jessee combinación de un antitusivo y expectorante. Se utiliza para aliviar Aaron tos.  Milagros medicamento también se utiliza para aflojar la m ¿Dónde kalin guardar mi medicina? Manténgala fuera del alcance de los niños. Guárdela a [employer], entre 20 y 22 grados C (76 y 68 grados F). Mantenga el envase luís cerrado.  Deseche todo el medicamento que no haya utilizado, después de la fech Allergies as of May 19, 2017     No Known Allergies                Today's Vital Signs     BP Pulse Temp Height Weight BMI    116/64 mmHg 70 98.1 °F (36.7 °C) (Oral) 63\" 225 lb 39.87 kg/m2         Current Medications          This list is accurate as of: lisinopril 2.5 MG Tabs   Take 1 tablet (2.5 mg total) by mouth daily.    Commonly known as:  PRINIVIL,ZESTRIL           MetFORMIN HCl 1000 MG Tabs   TAKE 1 TABLET BY MOUTH TWICE DAILY WITH MEALS   Commonly known as:  GLUCOPHAGE           naproxen 500 MG Ta [email or web address]

## (undated) NOTE — LETTER
8/22/2017          To Whom It May Concern:    Libby Villafana is currently under my medical care. She may return to work on 8/25/17 with no restrictions. If you require additional information please contact our office.         Sincerely,    Eladio Cordova

## (undated) NOTE — LETTER
Surgery Scheduling Request Form/Physician Orders  Fax to:  [phone]          [x]  New Case []  Modified []  Rescheduled To:  / / Brian Dominique order supersedes any conflicting corresponding orders on the pre-op order set. Surgery Date:2/15/2019Start Additional Orders:    Latex Allergy:  []  Yes          [x]  No    Pacemaker / AICD:  []  Yes          []  No     Day of Surgery Orders: same as above                              Date:  _      12/17/2018 __  Electronically Signed :Landon Villarreal M.D.     Time:

## (undated) NOTE — MR AVS SNAPSHOT
[address] [address]  [phone]               Thank you for choosing us for your health care visit with Kathya Joel DO.   We are glad to serve you and happy to provide you with this plummer your appointment immediately. However, if you are unsure about the requirements for authorization, please wait 5-7 days and then contact your physician's office.  At that time, you will be provided with any authorization numbers or be assured that none are Commonly known as:  cyclobenzaprine           ergocalciferol 84665 units Caps   Commonly known as:  DRISDOL/VITAMIN D2           Fluticasone Propionate 50 MCG/ACT Susp   1 spray by Each Nare route 2 (two) times daily as needed for Rhinitis.    Commonly know Hours:  24-hours Phone:  [phone]    - Cyclobenzaprine HCl 10 MG Tabs  - ketoconazole 2 % Crea            [employer] [facility]     Sign up for [employer] [facility], your secure online medical record.   [employer] [facility] will allow you to access patient instructions from your recent visit

## (undated) NOTE — LETTER
8/20/2018      Sigrid Hinojosa MD  Physical Medicine and Rehabilitation  [address] [address]  Dept: [phone]  Dept Fax: [phone]        RE: Consultation for Mehnaz Michelle        Dear Domingo Farfan, DO,    Thank you